# Patient Record
Sex: FEMALE | Race: WHITE | Employment: FULL TIME | URBAN - METROPOLITAN AREA
[De-identification: names, ages, dates, MRNs, and addresses within clinical notes are randomized per-mention and may not be internally consistent; named-entity substitution may affect disease eponyms.]

---

## 2017-10-12 ENCOUNTER — TELEPHONE (OUTPATIENT)
Dept: INTERNAL MEDICINE CLINIC | Age: 37
End: 2017-10-12

## 2018-06-11 ENCOUNTER — OFFICE VISIT (OUTPATIENT)
Dept: INTERNAL MEDICINE CLINIC | Age: 38
End: 2018-06-11

## 2018-06-11 VITALS
BODY MASS INDEX: 27.58 KG/M2 | SYSTOLIC BLOOD PRESSURE: 110 MMHG | WEIGHT: 182 LBS | OXYGEN SATURATION: 98 % | TEMPERATURE: 98.3 F | DIASTOLIC BLOOD PRESSURE: 72 MMHG | RESPIRATION RATE: 12 BRPM | HEART RATE: 78 BPM | HEIGHT: 68 IN

## 2018-06-11 DIAGNOSIS — R20.2 TINGLING: ICD-10-CM

## 2018-06-11 DIAGNOSIS — F41.9 ANXIETY: ICD-10-CM

## 2018-06-11 DIAGNOSIS — E66.3 OVERWEIGHT (BMI 25.0-29.9): ICD-10-CM

## 2018-06-11 DIAGNOSIS — Z00.00 ROUTINE PHYSICAL EXAMINATION: Primary | ICD-10-CM

## 2018-06-11 DIAGNOSIS — F33.42 RECURRENT MAJOR DEPRESSIVE DISORDER, IN FULL REMISSION (HCC): ICD-10-CM

## 2018-06-11 NOTE — MR AVS SNAPSHOT
[address] [employer] [employer] [address] 
[phone] Patient: Artie Torres MRN: [id number] RWS:[id number] Visit Information Date & Time Provider Department Dept. Phone Encounter #  
 6/11/2018 10:30 AM [facility]essa Olivier [id number] [phone] [id number] Follow-up Instructions Return in about 2 years (around 6/11/2020) for FULL PHYSICAL - 30 minutes. Upcoming Health Maintenance Date Due  
 PAP AKA CERVICAL CYTOLOGY 8/2/2002 Influenza Age 5 to Adult 8/1/2018 DTaP/Tdap/Td series (3 - Td) 12/16/2026 Allergies as of 6/11/2018  Review Complete On: 6/11/2018 By: Vanessa Olivier MD  
  
 Severity Noted Reaction Type Reactions Iodinated Contrast- Oral And Iv Dye  09/08/2014    Hives Current Immunizations  Reviewed on 6/11/2018 Name Date Influenza Vaccine 10/16/2017, 10/1/2014, 10/1/2013 Tdap 12/16/2016, 10/1/2013 Reviewed by Sasha Mota RN on 6/11/2018 at 10:25 AM  
You Were Diagnosed With   
  
 Codes Comments Routine physical examination    -  Primary ICD-10-CM: Z00.00 ICD-9-CM: V70.0 Overweight (BMI 25.0-29. 9)     ICD-10-CM: E42.4 ICD-9-CM: 278.02 Recurrent major [facility]     ICD-10-CM: F33.42 
ICD-9-CM: 296.36 Anxiety     ICD-10-CM: F41.9 ICD-9-CM: 300.00 Vitals BP Pulse Temp Resp Height(growth percentile) Weight(growth percentile) 110/72 78 98.3 °F (36.8 °C) (Oral) 12 5' 7.6\" (1.717 m) 182 lb (82.6 kg) LMP SpO2 Breastfeeding? BMI OB Status Smoking Status 06/05/2018 98% Unknown 28 kg/m2 Having regular periods Never Smoker BMI and BSA Data Body Mass Index Body Surface Area  
 28 kg/m 2 1.98 m 2 Preferred Pharmacy Pharmacy Name Phone [facility], [address] [place] [address] [phone] Your Updated Medication List  
  
   
 This list is accurate as of 6/11/18 11:01 AM.  Always use your most recent med list.  
  
  
  
  
 MULTIVITAMIN PO Take  by mouth daily. We Performed the Following LIPID PANEL [30328 CPT(R)] METABOLIC PANEL, COMPREHENSIVE [26906 CPT(R)] Follow-up Instructions Return in about 2 years (around 6/11/2020) for FULL PHYSICAL - 30 minutes. Patient Instructions Well Visit, Ages 25 to 48: Care Instructions Your Care Instructions Physical exams can help you stay healthy. Your doctor has checked your overall health and may have suggested ways to take good care of yourself. He or she also may have recommended tests. At home, you can help prevent illness with healthy eating, regular exercise, and other steps. Follow-up care is a key part of your treatment and safety. Be sure to make and go to all appointments, and call your doctor if you are having problems. It's also a good idea to know your test results and keep a list of the medicines you take. How can you care for yourself at home? · Reach and stay at a healthy weight. This will lower your risk for many problems, such as obesity, diabetes, heart disease, and high blood pressure. · Get at least 30 minutes of physical activity on most days of the week. Walking is a good choice. You also may want to do other activities, such as running, swimming, cycling, or playing tennis or team sports. Discuss any changes in your exercise program with your doctor. · Do not smoke or allow others to smoke around you. If you need help quitting, talk to your doctor about stop-smoking programs and medicines. These can increase your chances of quitting for good. · Talk to your doctor about whether you have any risk factors for sexually transmitted infections (STIs). Having one sex partner (who does not have STIs and does not have sex with anyone else) is a good way to avoid these infections. · Use birth control if you do not want to have children at this time. Talk with your doctor about the choices available and what might be best for you. · Protect your skin from too much sun. When you're outdoors from 10 a.m. to 4 p.m., stay in the shade or cover up with clothing and a hat with a wide brim. Wear sunglasses that block UV rays. Even when it's cloudy, put broad-spectrum sunscreen (SPF 30 or higher) on any exposed skin. · See a dentist one or two times a year for checkups and to have your teeth cleaned. · Wear a seat belt in the car. · Drink alcohol in moderation, if at all. That means no more than 2 drinks a day for men and 1 drink a day for women. Follow your doctor's advice about when to have certain tests. These tests can spot problems early. For everyone · Cholesterol. Have the fat (cholesterol) in your blood tested after age 21. Your doctor will tell you how often to have this done based on your age, family history, or other things that can increase your risk for heart disease. · Blood pressure. Have your blood pressure checked during a routine doctor visit. Your doctor will tell you how often to check your blood pressure based on your age, your blood pressure results, and other factors. · Vision. Talk with your doctor about how often to have a glaucoma test. 
· Diabetes. Ask your doctor whether you should have tests for diabetes. · Colon cancer. Have a test for colon cancer at age 48. You may have one of several tests. If you are younger than 48, you may need a test earlier if you have any risk factors. Risk factors include whether you already had a precancerous polyp removed from your colon or whether your parent, brother, sister, or child has had colon cancer. For women · Breast exam and mammogram. Talk to your doctor about when you should have a clinical breast exam and a mammogram. Medical experts differ on whether and how often women under 50 should have these tests.  Your doctor can help you decide what is right for you. · Pap test and pelvic exam. Begin Pap tests at age 24. A Pap test is the best way to find cervical cancer. The test often is part of a pelvic exam. Ask how often to have this test. 
· Tests for sexually transmitted infections (STIs). Ask whether you should have tests for STIs. You may be at risk if you have sex with more than one person, especially if your partners do not wear condoms. For men · Tests for sexually transmitted infections (STIs). Ask whether you should have tests for STIs. You may be at risk if you have sex with more than one person, especially if you do not wear a condom. · Testicular cancer exam. Ask your doctor whether you should check your testicles regularly. · Prostate exam. Talk to your doctor about whether you should have a blood test (called a PSA test) for prostate cancer. Experts differ on whether and when men should have this test. Some experts suggest it if you are older than 39 and are -American or have a father or brother who got prostate cancer when he was younger than 72. When should you call for help? Watch closely for changes in your health, and be sure to contact your doctor if you have any problems or symptoms that concern you. Where can you learn more? Go to http://babatunde-nichelle.info/. Enter P072 in the search box to learn more about \"Well Visit, Ages 25 to 48: Care Instructions. \" Current as of: May 12, 2017 Content Version: 11.4 © 5299-7544 Healthwise, Incorporated. Care instructions adapted under license by [employer] (which disclaims liability or warranty for this information). If you have questions about a medical condition or this instruction, always ask your healthcare professional. [place] [address] any warranty or liability for your use of this information. Introducing [facility] [place] [facility]!    
 [facility] introduces [employer] patient portal. Now you can access parts of your medical record, email your doctor's office, and request medication refills online. 1. In your internet browser, go to [email or web address] 2. Click on the First Time User? Click Here link in the Sign In box. You will see the New Member Sign Up page. 3. Enter your [employer] Access Code exactly as it appears below. You will not need to use this code after youve completed the sign-up process. If you do not sign up before the expiration date, you must request a new code. · [employer] Access Code: [email or web address] Expires: 9/9/2018 11:01 AM 
 
4. Enter the last four digits of your Social Security Number (xxxx) and Date of Birth (mm/dd/yyyy) as indicated and click Submit. You will be taken to the next sign-up page. 5. Create a [employer] ID. This will be your [employer] login ID and cannot be changed, so think of one that is secure and easy to remember. 6. Create a [employer] password. You can change your password at any time. 7. Enter your Password Reset Question and Answer. This can be used at a later time if you forget your password. 8. Enter your e-mail address. You will receive e-mail notification when new information is available in [address] 9. Click Sign Up. You can now view and download portions of your medical record. 10. Click the Download Summary menu link to download a portable copy of your medical information. If you have questions, please visit the Frequently Asked Questions section of the [employer] website. Remember, [employer] is NOT to be used for urgent needs. For medical emergencies, dial 911. Now available from your iPhone and Android! Please provide this summary of care documentation to your next provider. Your primary care clinician is listed as Niki Ortiz. If you have any questions after today's visit, please call [phone].

## 2018-06-11 NOTE — PATIENT INSTRUCTIONS

## 2018-06-11 NOTE — PROGRESS NOTES
Krunal Steve is a 40 y.o. female who was seen in clinic today (6/11/2018) for a full physical.  She was last seen in '16. Assessment & Plan:   Diagnoses and all orders for this visit:    1. Routine physical examination  -     METABOLIC PANEL, COMPREHENSIVE  -     LIPID PANEL  -     Labs from OB/GYN reviewed & discussed with her     2. Overweight (BMI 25.0-29. 9)- stable, needs improvement, I have reviewed/discussed the above normal BMI with the patient. I have recommended the following interventions: encourage exercise and lifestyle education regarding diet. 3. Recurrent major depressive disorder, in full remission ([facility]- well controlled and asymptomatic     4. Anxiety- this is a new issue to me, worse since children were born, fluctuating intensity, reviewed treatment options with her, recommended to see a counselor, reviewed life style changes to help improve mood, and at this point time will hold off starting on daily meds. She will reevaluate in the next several months and notify me if things get worse. Reviewed red flags to monitor for. 5. Tingling - new dx to me, chronic issue, on and off, sounds positional, reviewed red flags to monitor for, reviewed lifestyle changes         Follow-up Disposition:  Return in about 2 years (around 6/11/2020) for FULL PHYSICAL - 30 minutes. ------------------------------------------------------------------------------------------    Subjective: Tasha Teresa is here today for a full physical.      Health Maintenance  Immunizations:    Influenza: up to date. Tetanus: up to date. Cancer screening:    Cervical: reviewed guidelines, UTD, done by OBGYN, records requested. Breast: n/a, reviewed guidelines      Patient Care Team:  Navdeep Fair MD as PCP - General (Internal Medicine)  Nicolasa Bae MD as Physician (Obstetrics & Gynecology)         The following sections were reviewed & updated as appropriate: PMH, PSH, FH, and SH.     Patient Active Problem List   Diagnosis Code    Depression F32.9          Prior to Admission medications    Medication Sig Start Date End Date Taking? Authorizing Provider   MULTIVITAMIN PO Take  by mouth daily. Yes Historical Provider          Allergies   Allergen Reactions    Iodinated Contrast- Oral And Iv Dye Hives              Review of Systems   Constitutional: Negative for chills and fever. Respiratory: Negative for cough and shortness of breath. Cardiovascular: Negative for chest pain and palpitations. Gastrointestinal: Negative for abdominal pain, blood in stool, constipation, diarrhea, heartburn, nausea and vomiting. Musculoskeletal: Negative for joint pain and myalgias. Neurological: Positive for tingling (On/off, mostly in hands but can occur and feet, always position related: Laying down or sitting on the sofa). Negative for focal weakness and headaches. Endo/Heme/Allergies: Does not bruise/bleed easily. Psychiatric/Behavioral: Negative for depression. The patient is not nervous/anxious and does not have insomnia. Objective:   Physical Exam   Constitutional: She appears well-developed. No distress. HENT:   Right Ear: Tympanic membrane, external ear and ear canal normal.   Left Ear: Tympanic membrane, external ear and ear canal normal.   Nose: Nose normal.   Mouth/Throat: Uvula is midline, oropharynx is clear and moist and mucous membranes are normal. No posterior oropharyngeal erythema. Eyes: Conjunctivae and lids are normal. No scleral icterus. Neck: Neck supple. Carotid bruit is not present. No thyromegaly present. Cardiovascular: Regular rhythm and normal heart sounds. No murmur heard. Pulses:       Dorsalis pedis pulses are 2+ on the right side, and 2+ on the left side. Posterior tibial pulses are 2+ on the right side, and 2+ on the left side. Pulmonary/Chest: Effort normal and breath sounds normal. She has no wheezes. She has no rales. Abdominal: Soft. Bowel sounds are normal. She exhibits no mass. There is no hepatosplenomegaly. There is no tenderness. Musculoskeletal: Normal range of motion. She exhibits no edema. Cervical back: Normal.        Thoracic back: She exhibits no bony tenderness. Lumbar back: Normal.   Lymphadenopathy:     She has no cervical adenopathy. Neurological: She has normal strength. No sensory deficit. Skin: No rash noted. Psychiatric: She has a normal mood and affect. Her behavior is normal.          Visit Vitals    /72    Pulse 78    Temp 98.3 °F (36.8 °C) (Oral)    Resp 12    Ht 5' 7.6\" (1.717 m)    Wt 182 lb (82.6 kg)    LMP 06/05/2018    SpO2 98%    Breastfeeding Unknown    BMI 28 kg/m2          Advised her to call back or return to office if symptoms worsen/change/persist.  Discussed expected course/resolution/complications of diagnosis in detail with patient. Medication risks/benefits/costs/interactions/alternatives discussed with patient. She was given an after visit summary which includes diagnoses, current medications, & vitals. She expressed understanding with the diagnosis and plan. Aspects of this note may have been generated using voice recognition software. Despite editing, there may be some syntax errors.        Didier Church MD

## 2018-06-12 LAB
ALBUMIN SERPL-MCNC: 4.6 G/DL (ref 3.5–5.5)
ALBUMIN/GLOB SERPL: 1.8 {RATIO} (ref 1.2–2.2)
ALP SERPL-CCNC: 39 IU/L (ref 39–117)
ALT SERPL-CCNC: 9 IU/L (ref 0–32)
AST SERPL-CCNC: 17 IU/L (ref 0–40)
BILIRUB SERPL-MCNC: 0.4 MG/DL (ref 0–1.2)
BUN SERPL-MCNC: 12 MG/DL (ref 6–20)
BUN/CREAT SERPL: 18 (ref 9–23)
CALCIUM SERPL-MCNC: 9.1 MG/DL (ref 8.7–10.2)
CHLORIDE SERPL-SCNC: 101 MMOL/L (ref 96–106)
CHOLEST SERPL-MCNC: 185 MG/DL (ref 100–199)
CO2 SERPL-SCNC: 26 MMOL/L (ref 20–29)
CREAT SERPL-MCNC: 0.67 MG/DL (ref 0.57–1)
GFR SERPLBLD CREATININE-BSD FMLA CKD-EPI: 113 ML/MIN/1.73
GFR SERPLBLD CREATININE-BSD FMLA CKD-EPI: 131 ML/MIN/1.73
GLOBULIN SER CALC-MCNC: 2.6 G/DL (ref 1.5–4.5)
GLUCOSE SERPL-MCNC: 88 MG/DL (ref 65–99)
HDLC SERPL-MCNC: 56 MG/DL
LDLC SERPL CALC-MCNC: 118 MG/DL (ref 0–99)
POTASSIUM SERPL-SCNC: 3.9 MMOL/L (ref 3.5–5.2)
PROT SERPL-MCNC: 7.2 G/DL (ref 6–8.5)
SODIUM SERPL-SCNC: 140 MMOL/L (ref 134–144)
TRIGL SERPL-MCNC: 57 MG/DL (ref 0–149)
VLDLC SERPL CALC-MCNC: 11 MG/DL (ref 5–40)

## 2019-01-08 ENCOUNTER — OFFICE VISIT (OUTPATIENT)
Dept: INTERNAL MEDICINE CLINIC | Age: 39
End: 2019-01-08

## 2019-01-08 VITALS
WEIGHT: 188 LBS | DIASTOLIC BLOOD PRESSURE: 76 MMHG | OXYGEN SATURATION: 98 % | RESPIRATION RATE: 18 BRPM | BODY MASS INDEX: 28.92 KG/M2 | TEMPERATURE: 98.2 F | SYSTOLIC BLOOD PRESSURE: 128 MMHG | HEART RATE: 86 BPM

## 2019-01-08 DIAGNOSIS — F33.42 RECURRENT MAJOR DEPRESSIVE DISORDER, IN FULL REMISSION (HCC): ICD-10-CM

## 2019-01-08 DIAGNOSIS — R11.0 NAUSEA: ICD-10-CM

## 2019-01-08 DIAGNOSIS — N92.6 MENSTRUAL CHANGES: ICD-10-CM

## 2019-01-08 DIAGNOSIS — K64.8 INTERNAL HEMORRHOID: Primary | ICD-10-CM

## 2019-01-08 RX ORDER — LANOLIN ALCOHOL/MO/W.PET/CERES
325 CREAM (GRAM) TOPICAL
COMMUNITY
End: 2019-04-30

## 2019-01-08 RX ORDER — OMEPRAZOLE 40 MG/1
40 CAPSULE, DELAYED RELEASE ORAL DAILY
Qty: 30 CAP | Refills: 1 | Status: SHIPPED | OUTPATIENT
Start: 2019-01-08 | End: 2019-04-30

## 2019-01-08 RX ORDER — ZINC GLUCONATE 10 MG
LOZENGE ORAL
COMMUNITY
End: 2019-04-30

## 2019-01-08 NOTE — PROGRESS NOTES
Bozena Ayers is a 41 y.o. female who was seen in clinic today (1/8/2019) for an acute visit. Assessment & Plan:  
Diagnoses and all orders for this visit: 
 
1. Internal hemorrhoid- this is a chronic problem but new to me, symptoms are: intermittent, differential dx reviewed with the patient, exam is normal but symptoms sound consistent  Reviewed treatment options, will have her talk to surgeon. See AVS. -     REFERRAL TO COLON AND RECTAL SURGERY 2. Nausea- this is a new problem, symptoms are: intermittent, differential dx reviewed with the patient, exact etiology is unclear at this time. Sounds like it is GI related. Will start on meds below and reviewed expectations. She will update me in 2-4 weeks. Red flags were reviewed with the patient to RTC or notify me, expected time course for resolution reviewed. -     omeprazole (PRILOSEC) 40 mg capsule; Take 1 Cap by mouth daily. 3. Menstrual changes- new dx, is symptomatic, reviewed she has had 3 OBGYN's that recommended trial of OCP and I would have to agree I think it is worth a trial.  Reviewed  Trying meds for 3--6 months and then reassessing. 4. Recurrent major depressive disorder, in full remission ([facility]- well controlled off medications, continue w/ life style changes Follow-up Disposition: Not on File Subjective: Nannette was seen today for Hemorrhoids Hemorrhoids Patient complains of hemorrhoids. Onset of symptoms was 6 months ago ago with gradually worsening course since that time. She describes symptoms as bleeding on/off with BMs, pain with sitting, painful w/ intercourse. She denies painful defecation or constipation or straining w/ BM's. She has felt a mass and has had to push it back at times. Treatment to date has been OTC creams and increase in fiber. She reports these have not really helped. She had flex sig last spring w/ Dr Zara Lala, records requested.   She can't remember why she had the procedure. She does reports being told she had a hemorrhoid. She also reports changes in her menses. Increase in spotting, increase in nausea, and irregular cycles. She has seen 3 different OBGYN's, the recommendation is to start OCP which she is hesitant. Prior to Admission medications Medication Sig Start Date End Date Taking? Authorizing Provider  
ferrous sulfate 325 mg (65 mg iron) tablet Take 325 mg by mouth. Yes Provider, Historical  
magnesium 250 mg tab Take  by mouth. Yes Provider, Historical  
calcium-cholecalciferol, d3, (CALCIUM 600 + D) 600-125 mg-unit tab Take  by mouth. Yes Provider, Historical  
MULTIVITAMIN PO Take  by mouth daily. Yes Provider, Historical  
  
 
 
Allergies Allergen Reactions  Iodinated Contrast- Oral And Iv Dye Hives Review of Systems Constitutional: Negative for malaise/fatigue and weight loss. Respiratory: Negative for cough and shortness of breath. Cardiovascular: Negative for chest pain and palpitations. Gastrointestinal: Positive for blood in stool and nausea. Negative for abdominal pain, constipation, diarrhea, melena and vomiting. Psychiatric/Behavioral: Negative for depression. The patient is not nervous/anxious and does not have insomnia. Objective:  
Physical Exam  
Cardiovascular: Regular rhythm and normal heart sounds. No murmur heard. Pulmonary/Chest: Effort normal and breath sounds normal. She has no wheezes. She has no rales. Abdominal: Bowel sounds are normal. She exhibits no mass. There is no hepatosplenomegaly. There is no tenderness. Genitourinary: Rectal exam shows no external hemorrhoid, no internal hemorrhoid, no fissure, no mass and no tenderness. Genitourinary Comments: Chaperone: Minna Brice LPN Visit Vitals /76 (BP 1 Location: Right arm, BP Patient Position: Sitting) Pulse 86 Temp 98.2 °F (36.8 °C) (Oral) Resp 18 Wt 188 lb (85.3 kg) SpO2 98% BMI 28.92 kg/m² Disclaimer: 
Advised her to call back or return to office if symptoms worsen/change/persist. 
Discussed expected course/resolution/complications of diagnosis in detail with patient. Medication risks/benefits/costs/interactions/alternatives discussed with patient. She was given an after visit summary which includes diagnoses, current medications, & vitals. She expressed understanding with the diagnosis and plan. Aspects of this note may have been generated using voice recognition software. Despite editing, there may be some syntax errors.   
 
 
Melanie Berkowitz MD

## 2019-01-08 NOTE — PROGRESS NOTES
Chief Complaint Patient presents with  Hemorrhoids Patient states that over the last 2 years it has become uncomfortable. Patient states that it is painful to sit and have vaginal sex. Patient states she has been treating with OTC creams. Patient has 2-3 normal BM a day and does not strain. Patient noted that a month ago she went to the bathroom and she noticed a lot of blood. She also noticed it gets worse around her cycle.

## 2019-01-08 NOTE — PATIENT INSTRUCTIONS
Hemorrhoids: Care Instructions Your Care Instructions Hemorrhoids are enlarged veins that develop in the anal canal. Bleeding during bowel movements, itching, swelling, and rectal pain are the most common symptoms. They can be uncomfortable at times, but hemorrhoids rarely are a serious problem. You can treat most hemorrhoids with simple changes to your diet and bowel habits. These changes include eating more fiber and not straining to pass stools. Most hemorrhoids do not need surgery or other treatment unless they are very large and painful or bleed a lot. Follow-up care is a key part of your treatment and safety. Be sure to make and go to all appointments, and call your doctor if you are having problems. It's also a good idea to know your test results and keep a list of the medicines you take. How can you care for yourself at home? · Sit in a few inches of warm water (sitz bath) 3 times a day and after bowel movements. The warm water helps with pain and itching. · Put ice on your anal area several times a day for 10 minutes at a time. Put a thin cloth between the ice and your skin. Follow this by placing a warm, wet towel on the area for another 10 to 20 minutes. · Take pain medicines exactly as directed. ? If the doctor gave you a prescription medicine for pain, take it as prescribed. ? If you are not taking a prescription pain medicine, ask your doctor if you can take an over-the-counter medicine. · Keep the anal area clean, but be gentle. Use water and a fragrance-free soap, such as [place], or use baby wipes or medicated pads, such as Tucks. · Wear cotton underwear and loose clothing to decrease moisture in the anal area. · Eat more fiber. Include foods such as whole-grain breads and cereals, raw vegetables, raw and dried fruits, and beans. · Drink plenty of fluids, enough so that your urine is light yellow or clear like water.  If you have kidney, heart, or liver disease and have to limit fluids, talk with your doctor before you increase the amount of fluids you drink. · Use a stool softener that contains bran or psyllium. You can save money by buying bran or psyllium (available in bulk at most health food stores) and sprinkling it on foods or stirring it into fruit juice. Or you can use a product such as Metamucil or Hydrocil. · Practice healthy bowel habits. ? Go to the bathroom as soon as you have the urge. ? Avoid straining to pass stools. Relax and give yourself time to let things happen naturally. ? Do not hold your breath while passing stools. ? Do not read while sitting on the toilet. Get off the toilet as soon as you have finished. · Take your medicines exactly as prescribed. Call your doctor if you think you are having a problem with your medicine. When should you call for help? Call 911 anytime you think you may need emergency care. For example, call if: 
  · You pass maroon or very bloody stools.  
 Call your doctor now or seek immediate medical care if: 
  · You have increased pain.  
  · You have increased bleeding.  
 Watch closely for changes in your health, and be sure to contact your doctor if: 
  · Your symptoms have not improved after 3 or 4 days. Where can you learn more? Go to http://babatunde-nichelle.info/. Enter F228 in the search box to learn more about \"Hemorrhoids: Care Instructions. \" Current as of: March 28, 2018 Content Version: 11.8 © 0168-5899 [employer]. Care instructions adapted under license by [employer] (which disclaims liability or warranty for this information). If you have questions about a medical condition or this instruction, always ask your healthcare professional. [place] [address] any warranty or liability for your use of this information. Gastroesophageal Reflux Disease (GERD): Care Instructions Your Care Instructions Gastroesophageal reflux disease (GERD) is the backward flow of stomach acid into the esophagus. The esophagus is the tube that leads from your throat to your stomach. A one-way valve prevents the stomach acid from moving up into this tube. When you have GERD, this valve does not close tightly enough. If you have mild GERD symptoms including heartburn, you may be able to control the problem with antacids or over-the-counter medicine. Changing your diet, losing weight, and making other lifestyle changes can also help reduce symptoms. Follow-up care is a key part of your treatment and safety. Be sure to make and go to all appointments, and call your doctor if you are having problems. It's also a good idea to know your test results and keep a list of the medicines you take. How can you care for yourself at home? · Take your medicines exactly as prescribed. Call your doctor if you think you are having a problem with your medicine. · Your doctor may recommend over-the-counter medicine. For mild or occasional indigestion, antacids, such as Tums, Gaviscon, Mylanta, or Maalox, may help. Your doctor also may recommend over-the-counter acid reducers, such as Pepcid AC, Tagamet HB, Zantac 75, or Prilosec. Read and follow all instructions on the label. If you use these medicines often, talk with your doctor. · Change your eating habits. ? It's best to eat several small meals instead of two or three large meals. ? After you eat, wait 2 to 3 hours before you lie down. ? Chocolate, mint, and alcohol can make GERD worse. ? Spicy foods, foods that have a lot of acid (like tomatoes and oranges), and coffee can make GERD symptoms worse in some people. If your symptoms are worse after you eat a certain food, you may want to stop eating that food to see if your symptoms get better. · Do not smoke or chew tobacco. Smoking can make GERD worse.  If you need help quitting, talk to your doctor about stop-smoking programs and medicines. These can increase your chances of quitting for good. · If you have GERD symptoms at night, raise the head of your bed 6 to 8 inches by putting the frame on blocks or placing a foam wedge under the head of your mattress. (Adding extra pillows does not work.) · Do not wear tight clothing around your middle. · Lose weight if you need to. Losing just 5 to 10 pounds can help. When should you call for help? Call your doctor now or seek immediate medical care if: 
  · You have new or different belly pain.  
  · Your stools are black and tarlike or have streaks of blood.  
 Watch closely for changes in your health, and be sure to contact your doctor if: 
  · Your symptoms have not improved after 2 days.  
  · Food seems to catch in your throat or chest.  
Where can you learn more? Go to http://babatunde-nichelle.info/. Enter P985 in the search box to learn more about \"Gastroesophageal Reflux Disease (GERD): Care Instructions. \" Current as of: March 28, 2018 Content Version: 11.8 © 2654-2013 [employer]. Care instructions adapted under license by [employer] (which disclaims liability or warranty for this information). If you have questions about a medical condition or this instruction, always ask your healthcare professional. [address] any warranty or liability for your use of this information.

## 2019-01-21 ENCOUNTER — TELEPHONE (OUTPATIENT)
Dept: INTERNAL MEDICINE CLINIC | Age: 39
End: 2019-01-21

## 2019-01-21 NOTE — TELEPHONE ENCOUNTER
Please call to discuss the Prilosec.  It is not helping at  all  And she said now she is going to the bathroom alot

## 2019-01-22 NOTE — TELEPHONE ENCOUNTER
Patient is going to stop the prilosec per Dr. Ihsan Nick. No new symptoms since last visit just worsening. She reports she feels tired, nauseated, having \"hot flashes\", her menstrual cycles are heavier but will spot for several days. States she spoke to her Midwife (child is now 3years old) who told her she might have an \"estrogen dominance\". Midwife told her to take an Omega 369 supplement, vitex supplement and B12 complex that could help \"balance out her hormones\". Wants Dr. Ihsan Nick' opinion on this.

## 2019-01-22 NOTE — TELEPHONE ENCOUNTER
Stop the prilosec. She had been seen for nausea, is she having any other symptoms. What has changed since last visit?

## 2019-01-23 NOTE — TELEPHONE ENCOUNTER
Verified patient identity with two identifiers. Spoke with patient by phone. She is seeing Dr. Rodrick Bah DO with [facility] tomorrow.

## 2019-04-30 ENCOUNTER — OFFICE VISIT (OUTPATIENT)
Dept: INTERNAL MEDICINE CLINIC | Age: 39
End: 2019-04-30

## 2019-04-30 VITALS
BODY MASS INDEX: 28.34 KG/M2 | HEART RATE: 88 BPM | OXYGEN SATURATION: 97 % | HEIGHT: 68 IN | TEMPERATURE: 98.3 F | DIASTOLIC BLOOD PRESSURE: 68 MMHG | SYSTOLIC BLOOD PRESSURE: 104 MMHG | RESPIRATION RATE: 16 BRPM | WEIGHT: 187 LBS

## 2019-04-30 DIAGNOSIS — R05.9 COUGH: Primary | ICD-10-CM

## 2019-04-30 RX ORDER — FLUTICASONE PROPIONATE 50 MCG
2 SPRAY, SUSPENSION (ML) NASAL DAILY
Qty: 1 BOTTLE | Refills: 0 | COMMUNITY
Start: 2019-04-30 | End: 2021-11-19

## 2019-04-30 NOTE — PROGRESS NOTES
Agata Santos is a 41 y.o. female who was seen in clinic today (4/30/2019) for an acute visit. Assessment & Plan:     Diagnoses and all orders for this visit:    1. Cough- this is a new problem, symptoms are: not changed, differential dx reviewed with the patient, favor more post nasal drip then post infectious. Will treat with: oral anti-histamines & nasal steroids & saline rinses. Red flags were reviewed with the patient to RTC or notify me, expected time course for resolution reviewed. See AVS.  She will update me next week if no changes. Follow-up and Dispositions    · Return if symptoms worsen or fail to improve. Subjective: Nannette was seen today for Cough    URI Review  Nannette returns to clinic today to talk about: cough for 4-6 weeks, which are unchanged since that time. Cough is dry, mostly in the morning. She reports sore throat, post nasal drip, rhinorrhea, sinus congestion, chest congestion, wheezing and SOB/LOPEZ. She denies recently having any issues with fever, chills, sinus congestion and chest congestion. Treatments have included: none. Relevant PMH: No pertinent additional PMH. Patient reports sick contacts: no.  She recently moved, she is renovating a new house. Prior to Admission medications    Medication Sig Start Date End Date Taking? Authorizing Provider   calcium-cholecalciferol, d3, (CALCIUM 600 + D) 600-125 mg-unit tab Take  by mouth. Yes Provider, Historical          Allergies   Allergen Reactions    Iodinated Contrast- Oral And Iv Dye Hives           ROS - per HPI        Objective:   Physical Exam   Constitutional: No distress. HENT:   Right Ear: Tympanic membrane is not erythematous and not bulging. No middle ear effusion. Left Ear: Tympanic membrane is not erythematous and not bulging. No middle ear effusion. Nose: No mucosal edema or rhinorrhea. Right sinus exhibits no maxillary sinus tenderness and no frontal sinus tenderness.  Left sinus exhibits no maxillary sinus tenderness and no frontal sinus tenderness. Mouth/Throat: Uvula is midline and mucous membranes are normal. No oropharyngeal exudate or posterior oropharyngeal erythema. Eyes: Conjunctivae are normal. No scleral icterus. Neck: Neck supple. Cardiovascular: Regular rhythm and normal heart sounds. No murmur heard. Pulmonary/Chest: Effort normal and breath sounds normal. She has no wheezes. She has no rales. Lymphadenopathy:     She has no cervical adenopathy. Visit Vitals  /68   Pulse 88   Temp 98.3 °F (36.8 °C) (Oral)   Resp 16   Ht 5' 7.6\" (1.717 m)   Wt 187 lb (84.8 kg)   LMP 04/09/2019 (Approximate)   SpO2 97%   BMI 28.77 kg/m²         Disclaimer:  Advised her to call back or return to office if symptoms worsen/change/persist.  Discussed expected course/resolution/complications of diagnosis in detail with patient. Medication risks/benefits/costs/interactions/alternatives discussed with patient. She was given an after visit summary which includes diagnoses, current medications, & vitals. She expressed understanding with the diagnosis and plan. Aspects of this note may have been generated using voice recognition software. Despite editing, there may be some syntax errors.        Alicia Santamaria MD

## 2019-05-29 ENCOUNTER — OFFICE VISIT (OUTPATIENT)
Dept: INTERNAL MEDICINE CLINIC | Age: 39
End: 2019-05-29

## 2019-05-29 VITALS
TEMPERATURE: 98 F | DIASTOLIC BLOOD PRESSURE: 90 MMHG | HEIGHT: 68 IN | BODY MASS INDEX: 27.77 KG/M2 | OXYGEN SATURATION: 98 % | SYSTOLIC BLOOD PRESSURE: 130 MMHG | RESPIRATION RATE: 18 BRPM | WEIGHT: 183.2 LBS | HEART RATE: 78 BPM

## 2019-05-29 DIAGNOSIS — R11.0 NAUSEA: ICD-10-CM

## 2019-05-29 DIAGNOSIS — J30.1 SEASONAL ALLERGIC RHINITIS DUE TO POLLEN: Primary | ICD-10-CM

## 2019-05-29 RX ORDER — ONDANSETRON 8 MG/1
8 TABLET, ORALLY DISINTEGRATING ORAL
Qty: 30 TAB | Refills: 0 | Status: SHIPPED | OUTPATIENT
Start: 2019-05-29 | End: 2019-09-09

## 2019-05-29 NOTE — PROGRESS NOTES
Acute Care Note    Abraham King is 41 y.o. female. she presents for evaluation of Nausea    The patient complains of intermittent nausea. She notes that this is been for the previous 3 months. At this point, she is unsure of the cause. She has had a flare of this over the previous 2 days. She also feels slightly dizzy and feels as if she has some active postnasal drip. She was taking Flonase at a previous time. Now, she is only taking Benadryl. She is not taking any other allergy medications. Prior to Admission medications    Medication Sig Start Date End Date Taking? Authorizing Provider   fluticasone propionate (FLONASE) 50 mcg/actuation nasal spray 2 Sprays by Both Nostrils route daily. 4/30/19   Cyndy Barron MD   calcium-cholecalciferol, d3, (CALCIUM 600 + D) 600-125 mg-unit tab Take  by mouth. Provider, Historical         Patient Active Problem List   Diagnosis Code    Recurrent major depressive disorder ([facility] F33.9    Anxiety F41.9         Review of Systems   Constitutional: Negative. HENT: Positive for congestion. Respiratory: Positive for cough. Gastrointestinal: Positive for nausea. Negative for vomiting. Visit Vitals  /90 (BP 1 Location: Right arm, BP Patient Position: Sitting)   Pulse 78   Temp 98 °F (36.7 °C) (Oral)   Resp 18   Ht 5' 7.6\" (1.717 m)   Wt 183 lb 3.2 oz (83.1 kg)   LMP 05/15/2019   SpO2 98%   BMI 28.19 kg/m²       Physical Exam   Constitutional: She appears well-developed and well-nourished. HENT:   Mouth/Throat: Oropharynx is clear and moist.   Cardiovascular: Normal rate and regular rhythm. Pulmonary/Chest: Effort normal and breath sounds normal.           ASSESSMENT/PLAN  Diagnoses and all orders for this visit:    1. Seasonal allergic rhinitis due to pollen -darron with the patient that her symptoms seem likely as result of seasonal allergy.   Since she is not using any other antihistamine, she was advised to use Zyrtec at night.  She will follow-up if not improving. Advised the patient to call back or return to office if symptoms worsen/change/persist.   Discussed expected course/resolution/complications of diagnosis in detail with patient. Medication risks/benefits/costs/interactions/alternatives discussed with patient. The patient was given an after visit summary which includes diagnoses, current medications, & vitals. They expressed understanding with the diagnosis and plan.

## 2019-09-09 ENCOUNTER — OFFICE VISIT (OUTPATIENT)
Dept: INTERNAL MEDICINE CLINIC | Age: 39
End: 2019-09-09

## 2019-09-09 VITALS
WEIGHT: 184 LBS | BODY MASS INDEX: 27.89 KG/M2 | OXYGEN SATURATION: 97 % | HEART RATE: 83 BPM | RESPIRATION RATE: 16 BRPM | DIASTOLIC BLOOD PRESSURE: 76 MMHG | SYSTOLIC BLOOD PRESSURE: 112 MMHG | HEIGHT: 68 IN | TEMPERATURE: 98.3 F

## 2019-09-09 DIAGNOSIS — F33.42 RECURRENT MAJOR DEPRESSIVE DISORDER, IN FULL REMISSION (HCC): ICD-10-CM

## 2019-09-09 DIAGNOSIS — Z00.00 ROUTINE PHYSICAL EXAMINATION: Primary | ICD-10-CM

## 2019-09-09 DIAGNOSIS — F41.9 ANXIETY: ICD-10-CM

## 2019-09-09 DIAGNOSIS — E66.3 OVERWEIGHT (BMI 25.0-29.9): ICD-10-CM

## 2019-09-09 RX ORDER — BISMUTH SUBSALICYLATE 262 MG
1 TABLET,CHEWABLE ORAL DAILY
COMMUNITY

## 2019-09-09 NOTE — PROGRESS NOTES
Shirly Canavan is a 46 y.o. female who was seen in clinic today (9/9/2019) for a full physical.       Assessment & Plan:   Diagnoses and all orders for this visit:    1. Routine physical examination  -     METABOLIC PANEL, COMPREHENSIVE  -     CBC W/O DIFF  -     LIPID PANEL  -     HEMOGLOBIN A1C WITH EAG  -     TSH 3RD GENERATION    2. Overweight (BMI 25.0-29. 9)- stable, needs improvement. Reviewed BMI vs IBW. She has restricted calories to 8640-6948/day and exercising 3/wk. Reviewed possible diagnosis of PCOS. Offered MTF but will defer. Offered to get her into see endocrinologist instead of OBGYN. Will check labs above. I have recommended the following interventions: encourage exercise and lifestyle education regarding diet. 3. Recurrent major depressive disorder, in full remission ([facility]- well controlled off medications    4. Anxiety- well controlled off medications. Follow-up and Dispositions    · Return in about 1 year (around 9/9/2020) for FULL PHYSICAL - 30 minutes. ------------------------------------------------------------------------------------------    Subjective: Raffaele Niño is here today for a full physical.      Health Maintenance  Immunizations:   Influenza: she will plan on getting it this fall   Tetanus: up to date    Cancer screening:   Cervical: reviewed guidelines, up to date  Breast: reviewed guidelines, n/a. Patient Care Team:  Alicia Dooley MD as PCP - General (Internal Medicine)  Camila De Jesus MD as Physician (Obstetrics & Gynecology)         The following sections were reviewed & updated as appropriate: PMH, PSH, FH, and SH. Patient Active Problem List   Diagnosis Code    Recurrent major depressive disorder (HCC) F33.9    Anxiety F41.9          Prior to Admission medications    Medication Sig Start Date End Date Taking? Authorizing Provider   multivitamin (ONE A DAY) tablet Take 1 Tab by mouth daily.    Yes Provider, Historical fluticasone propionate (FLONASE) 50 mcg/actuation nasal spray 2 Sprays by Both Nostrils route daily. 4/30/19   Tobias Perez MD          Allergies   Allergen Reactions    Iodinated Contrast Media Hives              Review of Systems   Constitutional: Negative for chills and fever. Having trouble losing weight. She was told in the past had atypical PCOS. Has seen several OBGYN's in the past.  She reports + cysts on ovaries. She does not want want to take OCP which is the recommendation from all of them   Respiratory: Negative for cough and shortness of breath. Cardiovascular: Negative for chest pain and palpitations. Gastrointestinal: Positive for blood in stool (did see colo-rectal surgeon, considering surgery, can't band it). Negative for abdominal pain, constipation, diarrhea, heartburn, nausea and vomiting. Musculoskeletal: Negative for joint pain and myalgias. Neurological: Negative for tingling, focal weakness and headaches. Endo/Heme/Allergies: Does not bruise/bleed easily. Psychiatric/Behavioral: Negative for depression. The patient is not nervous/anxious and does not have insomnia. Objective:   Physical Exam   Constitutional: She appears well-developed. No distress. HENT:   Right Ear: Tympanic membrane, external ear and ear canal normal.   Left Ear: Tympanic membrane, external ear and ear canal normal.   Nose: Nose normal.   Mouth/Throat: Uvula is midline, oropharynx is clear and moist and mucous membranes are normal. No posterior oropharyngeal erythema. Eyes: Conjunctivae and lids are normal. No scleral icterus. Neck: Neck supple. No thyromegaly present. Cardiovascular: Regular rhythm and normal heart sounds. No murmur heard. Pulses:       Dorsalis pedis pulses are 2+ on the right side, and 2+ on the left side. Posterior tibial pulses are 2+ on the right side, and 2+ on the left side.    Pulmonary/Chest: Effort normal and breath sounds normal. She has no wheezes. She has no rales. Abdominal: Soft. Bowel sounds are normal. She exhibits no mass. There is no hepatosplenomegaly. There is no tenderness. Musculoskeletal: Normal range of motion. She exhibits no edema. Cervical back: Normal.        Thoracic back: She exhibits no bony tenderness. Lumbar back: Normal.   Lymphadenopathy:     She has no cervical adenopathy. Neurological: She has normal strength. No sensory deficit. Skin: No rash noted. Psychiatric: She has a normal mood and affect. Her behavior is normal.          Visit Vitals  /76   Pulse 83   Temp 98.3 °F (36.8 °C) (Oral)   Resp 16   Ht 5' 8.25\" (1.734 m)   Wt 184 lb (83.5 kg)   SpO2 97%   BMI 27.77 kg/m²          Advised her to call back or return to office if symptoms worsen/change/persist.  Discussed expected course/resolution/complications of diagnosis in detail with patient. Medication risks/benefits/costs/interactions/alternatives discussed with patient. She was given an after visit summary which includes diagnoses, current medications, & vitals. She expressed understanding with the diagnosis and plan. Aspects of this note may have been generated using voice recognition software. Despite editing, there may be some syntax errors.        Hayes Burleson MD

## 2019-09-09 NOTE — PATIENT INSTRUCTIONS
Well Visit, Ages 25 to 48: Care Instructions  Your Care Instructions    Physical exams can help you stay healthy. Your doctor has checked your overall health and may have suggested ways to take good care of yourself. He or she also may have recommended tests. At home, you can help prevent illness with healthy eating, regular exercise, and other steps. Follow-up care is a key part of your treatment and safety. Be sure to make and go to all appointments, and call your doctor if you are having problems. It's also a good idea to know your test results and keep a list of the medicines you take. How can you care for yourself at home? · Reach and stay at a healthy weight. This will lower your risk for many problems, such as obesity, diabetes, heart disease, and high blood pressure. · Get at least 30 minutes of physical activity on most days of the week. Walking is a good choice. You also may want to do other activities, such as running, swimming, cycling, or playing tennis or team sports. Discuss any changes in your exercise program with your doctor. · Do not smoke or allow others to smoke around you. If you need help quitting, talk to your doctor about stop-smoking programs and medicines. These can increase your chances of quitting for good. · Talk to your doctor about whether you have any risk factors for sexually transmitted infections (STIs). Having one sex partner (who does not have STIs and does not have sex with anyone else) is a good way to avoid these infections. · Use birth control if you do not want to have children at this time. Talk with your doctor about the choices available and what might be best for you. · Protect your skin from too much sun. When you're outdoors from 10 a.m. to 4 p.m., stay in the shade or cover up with clothing and a hat with a wide brim. Wear sunglasses that block UV rays. Even when it's cloudy, put broad-spectrum sunscreen (SPF 30 or higher) on any exposed skin.   · See a dentist one or two times a year for checkups and to have your teeth cleaned. · Wear a seat belt in the car. Follow your doctor's advice about when to have certain tests. These tests can spot problems early. For everyone  · Cholesterol. Have the fat (cholesterol) in your blood tested after age 21. Your doctor will tell you how often to have this done based on your age, family history, or other things that can increase your risk for heart disease. · Blood pressure. Have your blood pressure checked during a routine doctor visit. Your doctor will tell you how often to check your blood pressure based on your age, your blood pressure results, and other factors. · Vision. Talk with your doctor about how often to have a glaucoma test.  · Diabetes. Ask your doctor whether you should have tests for diabetes. · Colon cancer. Your risk for colorectal cancer gets higher as you get older. Some experts say that adults should start regular screening at age 48 and stop at age 76. Others say to start before age 48 or continue after age 76. Talk with your doctor about your risk and when to start and stop screening. For women  · Breast exam and mammogram. Talk to your doctor about when you should have a clinical breast exam and a mammogram. Medical experts differ on whether and how often women under 50 should have these tests. Your doctor can help you decide what is right for you. · Cervical cancer screening test and pelvic exam. Begin with a Pap test at age 24. The test often is part of a pelvic exam. Starting at age 27, you may choose to have a Pap test, an HPV test, or both tests at the same time (called co-testing). Talk with your doctor about how often to have testing. · Tests for sexually transmitted infections (STIs). Ask whether you should have tests for STIs. You may be at risk if you have sex with more than one person, especially if your partners do not wear condoms.   For men  · Tests for sexually transmitted infections (STIs). Ask whether you should have tests for STIs. You may be at risk if you have sex with more than one person, especially if you do not wear a condom. · Testicular cancer exam. Ask your doctor whether you should check your testicles regularly. · Prostate exam. Talk to your doctor about whether you should have a blood test (called a PSA test) for prostate cancer. Experts differ on whether and when men should have this test. Some experts suggest it if you are older than 39 and are -American or have a father or brother who got prostate cancer when he was younger than 72. When should you call for help? Watch closely for changes in your health, and be sure to contact your doctor if you have any problems or symptoms that concern you. Where can you learn more? Go to http://babatunde-nichelle.info/. Enter P072 in the search box to learn more about \"Well Visit, Ages 25 to 48: Care Instructions. \"  Current as of: December 13, 2018  Content Version: 12.1  © 6918-4205 [employer] Incorporated. Care instructions adapted under license by [employer] (which disclaims liability or warranty for this information). If you have questions about a medical condition or this instruction, always ask your healthcare professional. [place] [address] any warranty or liability for your use of this information.

## 2019-09-10 LAB
ALBUMIN SERPL-MCNC: 4.7 G/DL (ref 3.5–5.5)
ALBUMIN/GLOB SERPL: 2 {RATIO} (ref 1.2–2.2)
ALP SERPL-CCNC: 37 IU/L (ref 39–117)
ALT SERPL-CCNC: 13 IU/L (ref 0–32)
AST SERPL-CCNC: 17 IU/L (ref 0–40)
BILIRUB SERPL-MCNC: 0.5 MG/DL (ref 0–1.2)
BUN SERPL-MCNC: 16 MG/DL (ref 6–20)
BUN/CREAT SERPL: 21 (ref 9–23)
CALCIUM SERPL-MCNC: 9 MG/DL (ref 8.7–10.2)
CHLORIDE SERPL-SCNC: 104 MMOL/L (ref 96–106)
CHOLEST SERPL-MCNC: 182 MG/DL (ref 100–199)
CO2 SERPL-SCNC: 25 MMOL/L (ref 20–29)
CREAT SERPL-MCNC: 0.75 MG/DL (ref 0.57–1)
ERYTHROCYTE [DISTWIDTH] IN BLOOD BY AUTOMATED COUNT: 12.3 % (ref 12.3–15.4)
EST. AVERAGE GLUCOSE BLD GHB EST-MCNC: 105 MG/DL
GLOBULIN SER CALC-MCNC: 2.4 G/DL (ref 1.5–4.5)
GLUCOSE SERPL-MCNC: 87 MG/DL (ref 65–99)
HBA1C MFR BLD: 5.3 % (ref 4.8–5.6)
HCT VFR BLD AUTO: 36.1 % (ref 34–46.6)
HDLC SERPL-MCNC: 60 MG/DL
HGB BLD-MCNC: 12.7 G/DL (ref 11.1–15.9)
LDLC SERPL CALC-MCNC: 113 MG/DL (ref 0–99)
MCH RBC QN AUTO: 32.1 PG (ref 26.6–33)
MCHC RBC AUTO-ENTMCNC: 35.2 G/DL (ref 31.5–35.7)
MCV RBC AUTO: 91 FL (ref 79–97)
PLATELET # BLD AUTO: 195 X10E3/UL (ref 150–450)
POTASSIUM SERPL-SCNC: 4.1 MMOL/L (ref 3.5–5.2)
PROT SERPL-MCNC: 7.1 G/DL (ref 6–8.5)
RBC # BLD AUTO: 3.96 X10E6/UL (ref 3.77–5.28)
SODIUM SERPL-SCNC: 139 MMOL/L (ref 134–144)
TRIGL SERPL-MCNC: 47 MG/DL (ref 0–149)
TSH SERPL DL<=0.005 MIU/L-ACNC: 1.74 UIU/ML (ref 0.45–4.5)
VLDLC SERPL CALC-MCNC: 9 MG/DL (ref 5–40)
WBC # BLD AUTO: 5.4 X10E3/UL (ref 3.4–10.8)

## 2019-09-24 ENCOUNTER — OFFICE VISIT (OUTPATIENT)
Dept: INTERNAL MEDICINE CLINIC | Age: 39
End: 2019-09-24

## 2019-09-24 VITALS
OXYGEN SATURATION: 99 % | DIASTOLIC BLOOD PRESSURE: 80 MMHG | WEIGHT: 187 LBS | HEIGHT: 68 IN | TEMPERATURE: 97.5 F | SYSTOLIC BLOOD PRESSURE: 112 MMHG | HEART RATE: 94 BPM | RESPIRATION RATE: 16 BRPM | BODY MASS INDEX: 28.34 KG/M2

## 2019-09-24 DIAGNOSIS — H69.83 EUSTACHIAN TUBE DYSFUNCTION, BILATERAL: Primary | ICD-10-CM

## 2019-09-24 NOTE — PATIENT INSTRUCTIONS
Eustachian Tube Problems: Care Instructions  Your Care Instructions    The eustachian (say \"you-STAY-shee-un\") tubes run between the inside of the ears and the throat. They keep air pressure stable in the ears. If your eustachian tubes become blocked, the air pressure in your ears changes. The fluids from a cold can clog eustachian tubes, causing pain in the ears. A quick change in air pressure can cause eustachian tubes to close up. This might happen when an airplane changes altitude or when a  goes up or down underwater. Eustachian tube problems often clear up on their own or after antibiotic treatment. If your tubes continue to be blocked, you may need surgery. Follow-up care is a key part of your treatment and safety. Be sure to make and go to all appointments, and call your doctor if you are having problems. It's also a good idea to know your test results and keep a list of the medicines you take. How can you care for yourself at home? · To ease ear pain, apply a warm washcloth or a heating pad set on low. There may be some drainage from the ear when the heat melts earwax. Put a cloth between the heat source and your skin. Do not use a heating pad with children. · If your doctor prescribed antibiotics, take them as directed. Do not stop taking them just because you feel better. You need to take the full course of antibiotics. · Your doctor may recommend over-the-counter medicine. Be safe with medicines. Oral or nasal decongestants may relieve ear pain. Avoid decongestants that are combined with antihistamines, which tend to cause more blockage. But if allergies seem to be the problem, your doctor may recommend a combination. Be careful with cough and cold medicines. Don't give them to children younger than 6, because they don't work for children that age and can even be harmful. For children 6 and older, always follow all the instructions carefully.  Make sure you know how much medicine to give and how long to use it. And use the dosing device if one is included. When should you call for help? Call your doctor now or seek immediate medical care if:    · You develop sudden, complete hearing loss.     · You have severe pain or feel dizzy.     · You have new or increasing pus or blood draining from your ear.     · You have redness, swelling, or pain around or behind the ear.    Watch closely for changes in your health, and be sure to contact your doctor if:    · You do not get better after 2 weeks.     · You have any new symptoms, such as itching or a feeling of fullness in the ear. Where can you learn more? Go to http://babatunde-nichelle.info/. Enter Y822 in the search box to learn more about \"Eustachian Tube Problems: Care Instructions. \"  Current as of: October 21, 2018  Content Version: 12.2  © 7862-2590 [employer]. Care instructions adapted under license by [employer] (which disclaims liability or warranty for this information). If you have questions about a medical condition or this instruction, always ask your healthcare professional. [address] any warranty or liability for your use of this information.

## 2019-09-24 NOTE — PROGRESS NOTES
Jez Jaeger is a 46 y.o. female who was seen in clinic today (9/24/2019) for an acute visit. Assessment & Plan:     Diagnoses and all orders for this visit:    1. Eustachian tube dysfunction, bilateral- this is a new problem, symptoms are: not changed, differential dx reviewed with the patient, and this is likely etiology. Reassured no signs of otitis media or cerumen impaction. Will treat with: Flonase, sudafed, and saline rinses. Red flags were reviewed with the patient to RTC or notify me, expected time course for resolution reviewed. See AVS.          Follow-up and Dispositions    · Return if symptoms worsen or fail to improve. Subjective: Nannette was seen today for Ear Pain and Elbow Pain    URI Review  Nannette returns to clinic today to talk about: bilateral ear fullness/pressure for 4 days ago, which is fluctuating since that time, currently slightly worse. She reports tinnitus and decreased hearing. She denies a history of: fever, headache, sinus congestion, post nasal drip, rhinorrhea and chest congestion. Treatments have included: Sudafed which have been temporarily effective, maybe. Relevant PMH: No pertinent additional PMH. Patient reports sick contacts: no.         Prior to Admission medications    Medication Sig Start Date End Date Taking? Authorizing Provider   multivitamin (ONE A DAY) tablet Take 1 Tab by mouth daily. Yes Provider, Historical   fluticasone propionate (FLONASE) 50 mcg/actuation nasal spray 2 Sprays by Both Nostrils route daily. 4/30/19   Fiorella Estevez MD          Allergies   Allergen Reactions    Iodinated Contrast Media Hives           ROS - per HPI        Objective:   Physical Exam   Constitutional: No distress. HENT:   Right Ear: Tympanic membrane is not erythematous and not bulging. A middle ear effusion (minimal) is present. Left Ear: Tympanic membrane is not erythematous and not bulging. No middle ear effusion.    Nose: No mucosal edema or rhinorrhea. Right sinus exhibits no maxillary sinus tenderness and no frontal sinus tenderness. Left sinus exhibits no maxillary sinus tenderness and no frontal sinus tenderness. Mouth/Throat: Uvula is midline and mucous membranes are normal. No oropharyngeal exudate or posterior oropharyngeal erythema. Eyes: Conjunctivae are normal. No scleral icterus. Neck: Neck supple. Cardiovascular: Regular rhythm and normal heart sounds. No murmur heard. Pulmonary/Chest: Effort normal and breath sounds normal. She has no wheezes. She has no rales. Lymphadenopathy:     She has no cervical adenopathy. Visit Vitals  /80   Pulse 94   Temp 97.5 °F (36.4 °C) (Oral)   Resp 16   Ht 5' 8.25\" (1.734 m)   Wt 187 lb (84.8 kg)   LMP 09/19/2019   SpO2 99%   BMI 28.23 kg/m²         Disclaimer:  Advised her to call back or return to office if symptoms worsen/change/persist.  Discussed expected course/resolution/complications of diagnosis in detail with patient. Medication risks/benefits/costs/interactions/alternatives discussed with patient. She was given an after visit summary which includes diagnoses, current medications, & vitals. She expressed understanding with the diagnosis and plan. Aspects of this note may have been generated using voice recognition software. Despite editing, there may be some syntax errors.        Preston Palma MD

## 2019-09-26 ENCOUNTER — TELEPHONE (OUTPATIENT)
Dept: INTERNAL MEDICINE CLINIC | Age: 39
End: 2019-09-26

## 2019-09-26 DIAGNOSIS — H69.83 EUSTACHIAN TUBE DYSFUNCTION, BILATERAL: Primary | ICD-10-CM

## 2019-09-26 RX ORDER — METHYLPREDNISOLONE 4 MG/1
TABLET ORAL
Qty: 1 DOSE PACK | Refills: 0 | Status: SHIPPED | OUTPATIENT
Start: 2019-09-26 | End: 2019-10-02

## 2019-09-26 NOTE — TELEPHONE ENCOUNTER
Pt says that she saw dr Tatum Carnes on tues for ear pain and he told her to call back if she got worse and it has.

## 2019-09-26 NOTE — TELEPHONE ENCOUNTER
Patient reports her right ear has not improved, gotten worse, she has difficulty hearing, built up pressure and is feeling nauseated and dizzy and has some ringing in her ears. Her pain is 4/10 and she has tried everything Dr. Stafford Bolds recommended without relief. She is going out of town tomorrow afternoon, please let her know if there is anything else she can do. Aware Dr. Stafford Bolds out of the office and we will call her back tomorrow morning with a recommendation.

## 2019-10-03 RX ORDER — AMOXICILLIN AND CLAVULANATE POTASSIUM 875; 125 MG/1; MG/1
1 TABLET, FILM COATED ORAL EVERY 12 HOURS
Qty: 14 TAB | Refills: 0 | Status: SHIPPED | OUTPATIENT
Start: 2019-10-03 | End: 2019-10-10

## 2019-11-04 ENCOUNTER — OFFICE VISIT (OUTPATIENT)
Dept: URGENT CARE | Age: 39
End: 2019-11-04

## 2019-11-04 VITALS
WEIGHT: 190 LBS | SYSTOLIC BLOOD PRESSURE: 120 MMHG | DIASTOLIC BLOOD PRESSURE: 71 MMHG | HEIGHT: 68 IN | RESPIRATION RATE: 18 BRPM | TEMPERATURE: 98.8 F | HEART RATE: 86 BPM | OXYGEN SATURATION: 98 % | BODY MASS INDEX: 28.79 KG/M2

## 2019-11-04 DIAGNOSIS — S86.811A STRAIN OF CALF MUSCLE, RIGHT, INITIAL ENCOUNTER: Primary | ICD-10-CM

## 2019-11-04 NOTE — PROGRESS NOTES
Leg Pain   This is a new problem. The current episode started more than 1 week ago. The problem occurs daily. The problem has not changed since onset. Associated symptoms comments: Rt mid/ lower calf pain  Started after 1 week before after running on street with new type of stroller  Pain increases  after prolong sitting and resolves with activity. Treatments tried: compression / stocking  The treatment provided moderate (pain returns when removes stocking ) relief. Past Medical History:   Diagnosis Date    Depression 10/6/2014        Past Surgical History:   Procedure Laterality Date    HX COLONOSCOPY N/A 02/12/2010    normal; bx colon-lymphoid aggregates, bx ileum- no diagnostic features    HX ORTHOPAEDIC Right 1998    wrist arthroscopy         Family History   Problem Relation Age of Onset    High Cholesterol Mother     High Cholesterol Father     High Cholesterol Brother     High Cholesterol Brother     No Known Problems Brother     No Known Problems Daughter     No Known Problems Son         Social History     Socioeconomic History    Marital status:      Spouse name: Not on file    Number of children: Not on file    Years of education: Not on file    Highest education level: Not on file   Occupational History    Not on file   Social Needs    Financial resource strain: Not on file    Food insecurity:     Worry: Not on file     Inability: Not on file    Transportation needs:     Medical: Not on file     Non-medical: Not on file   Tobacco Use    Smoking status: Never Smoker    Smokeless tobacco: Never Used   Substance and Sexual Activity    Alcohol use:  Yes     Alcohol/week: 1.0 standard drinks     Types: 1 Standard drinks or equivalent per week     Frequency: 2-4 times a month     Drinks per session: 1 or 2     Binge frequency: Never    Drug use: No    Sexual activity: Yes     Partners: Male     Birth control/protection: Surgical     Comment:  had vasectomy Lifestyle    Physical activity:     Days per week: Not on file     Minutes per session: Not on file    Stress: Not on file   Relationships    Social connections:     Talks on phone: Not on file     Gets together: Not on file     Attends Jain service: Not on file     Active member of club or organization: Not on file     Attends meetings of clubs or organizations: Not on file     Relationship status: Not on file    Intimate partner violence:     Fear of current or ex partner: Not on file     Emotionally abused: Not on file     Physically abused: Not on file     Forced sexual activity: Not on file   Other Topics Concern    Not on file   Social History Narrative    Not on file                ALLERGIES: Iodinated contrast media    Review of Systems   Musculoskeletal: Negative for gait problem. All other systems reviewed and are negative. Vitals:    11/04/19 0934   BP: 120/71   Pulse: 86   Resp: 18   Temp: 98.8 °F (37.1 °C)   SpO2: 98%   Weight: 190 lb (86.2 kg)   Height: 5' 8.25\" (1.734 m)       Physical Exam   Constitutional: No distress. Musculoskeletal:        Right hip: Normal.        Right knee: Normal.        Right ankle: Normal.        Right lower leg: She exhibits no tenderness, no swelling and no edema. Legs:  Nursing note and vitals reviewed. MDM    Procedures             ICD-10-CM ICD-9-CM    1. Strain of calf muscle, right, initial encounter S86.811A 844.8      Reassured  Calf stretching- nsaid/ magnesium oxide  Follow with PCP    No orders of the defined types were placed in this encounter. No results found for any visits on 11/04/19. The patients condition was discussed with the patient and they understand. The patient is to follow up with primary care doctor. If signs and symptoms become worse the pt is to go to the ER. The patient is to take medications as prescribed.

## 2019-11-04 NOTE — PATIENT INSTRUCTIONS
Calf Strain: Rehab Exercises  Introduction  Here are some examples of exercises for you to try. The exercises may be suggested for a condition or for rehabilitation. Start each exercise slowly. Ease off the exercises if you start to have pain. You will be told when to start these exercises and which ones will work best for you. How to do the exercises  Calf wall stretch (back knee straight)    1. Stand facing a wall with your hands on the wall at about eye level. Put your affected leg about a step behind your other leg. 2. Keeping your back leg straight and your back heel on the floor, bend your front knee and gently bring your hip and chest toward the wall until you feel a stretch in the calf of your back leg. 3. Hold the stretch for at least 15 to 30 seconds. 4. Repeat 2 to 4 times. Calf wall stretch (knees bent)    1. Stand facing a wall with your hands on the wall at about eye level. Put your affected leg about a step behind your other leg. 2. Keeping both heels on the floor, bend both knees. Then gently bring your hip and chest toward the wall until you feel a stretch in the calf of your back leg. 3. Hold the stretch for at least 15 to 30 seconds. 4. Repeat 2 to 4 times. Bilateral calf stretch (knees straight)    1. Place a book on the floor a few inches from a wall or countertop, and put the balls of your feet on it. Your heels should be on the floor. The book needs to be thick enough so that you can feel a gentle stretch in each calf. If you are not steady on your feet, hold on to a chair, counter, or wall while you do this stretch. 2. Keep your knees straight, and lean forward until you feel a stretch in each calf. 3. To get more stretch, add another book or use a thicker book, such as a phone book, a dictionary, or an encyclopedia. 4. Hold the stretch for at least 15 to 30 seconds. 5. Repeat 2 to 4 times. Bilateral calf stretch (knees bent)    1.  Place a book on the floor a few inches from a wall or countertop, and put the balls of your feet on it. Your heels should be on the floor. The book needs to be thick enough so that you can feel a gentle stretch in each calf. If you are not steady on your feet, hold on to a chair, counter, or wall while you do this stretch. 2. Bend your knees, and lean forward until you feel a stretch in each calf. 3. To get more stretch, add another book or use a thicker book, such as a phone book, a dictionary, or an encyclopedia. 4. Hold the stretch for at least 15 to 30 seconds. 5. Repeat 2 to 4 times. Ankle plantarflexion    1. Sit with your affected leg straight and supported on the floor. Your other leg should be bent, with that foot flat on the floor. 2. Keeping your affected leg straight, gently flex your foot downward so your toes are pointed away from your body. Then slowly relax your foot to the starting position. 3. Repeat 8 to 12 times. Ankle dorsiflexion    1. Sit with your affected leg straight and supported on the floor. Your other leg should be bent, with that foot flat on the floor. 2. Keeping your leg straight, gently flex your foot back so your toes point upward. Then slowly relax your foot to the starting position. 3. Repeat 8 to 12 times. Bilateral heel raises on step    1. Stand on the bottom step of a staircase, facing up toward the stairs. Put the balls of your feet on the step. If you are not steady on your feet, hold on to the banister or wall. 2. Keeping both knees straight, slowly lift your heels above the step so that you are standing on your toes. Then slowly lower your heels below the step and toward the floor. 3. Return to the starting position, with your feet even with the step. 4. Repeat 8 to 12 times. Follow-up care is a key part of your treatment and safety. Be sure to make and go to all appointments, and call your doctor if you are having problems.  It's also a good idea to know your test results and keep a list of the medicines you take. Where can you learn more? Go to http://babatunde-nichelle.info/. Enter N086 in the search box to learn more about \"Calf Strain: Rehab Exercises. \"  Current as of: June 26, 2019  Content Version: 12.2  © 0330-9027 [employer]. Care instructions adapted under license by [employer] (which disclaims liability or warranty for this information). If you have questions about a medical condition or this instruction, always ask your healthcare professional. [address] any warranty or liability for your use of this information.

## 2020-01-20 DIAGNOSIS — M25.521 RIGHT ELBOW PAIN: Primary | ICD-10-CM

## 2021-09-27 ENCOUNTER — OFFICE VISIT (OUTPATIENT)
Dept: INTERNAL MEDICINE CLINIC | Age: 41
End: 2021-09-27
Payer: COMMERCIAL

## 2021-09-27 VITALS
BODY MASS INDEX: 21.76 KG/M2 | HEIGHT: 68 IN | TEMPERATURE: 98.4 F | DIASTOLIC BLOOD PRESSURE: 60 MMHG | OXYGEN SATURATION: 98 % | WEIGHT: 143.6 LBS | HEART RATE: 94 BPM | RESPIRATION RATE: 14 BRPM | SYSTOLIC BLOOD PRESSURE: 100 MMHG

## 2021-09-27 DIAGNOSIS — F51.01 PRIMARY INSOMNIA: Primary | ICD-10-CM

## 2021-09-27 PROCEDURE — 99213 OFFICE O/P EST LOW 20 MIN: CPT | Performed by: INTERNAL MEDICINE

## 2021-09-27 RX ORDER — ZOLPIDEM TARTRATE 5 MG/1
2.5 TABLET ORAL
Qty: 15 TABLET | Refills: 0 | Status: SHIPPED | OUTPATIENT
Start: 2021-09-27 | End: 2021-11-22 | Stop reason: SDUPTHER

## 2021-09-27 NOTE — PROGRESS NOTES
Acute Care Note    Jeanie Silva is 37 y.o. female. she presents for evaluation of Sleep Problem      She has had difficulty sleeping for nearly 5 days. She notes that she is a  and has recently taken on a new class for which she has to wake up at [address].  She intially started with some difficulty sleeping. She reports that now, she reports feeling anxious regarding being able to sleep in preparation for that class. She reports that since this has been ongoing, she has felt more anxiety regarding sleep in general.  She has slept no more than 2 hours a night in the past 3 nights. She has not had significant insomnia in the past. She has not had any medication or supplement changes. Prior to Admission medications    Medication Sig Start Date End Date Taking? Authorizing Provider   multivitamin (ONE A DAY) tablet Take 1 Tab by mouth daily. Yes Provider, Historical   fluticasone propionate (FLONASE) 50 mcg/actuation nasal spray 2 Sprays by Both Nostrils route daily. 4/30/19   Bryce New MD         Patient Active Problem List   Diagnosis Code    Recurrent major depressive disorder ([facility] F33.9    Anxiety F41.9         Review of Systems   Constitutional: Negative. Respiratory: Negative. Cardiovascular: Negative. Psychiatric/Behavioral: The patient is nervous/anxious and has insomnia. Visit Vitals  /60 (BP 1 Location: Left arm, BP Patient Position: Sitting, BP Cuff Size: Adult)   Pulse 94   Temp 98.4 °F (36.9 °C) (Temporal)   Resp 14   Ht 5' 8.25\" (1.734 m)   Wt 143 lb 9.6 oz (65.1 kg)   SpO2 98%   BMI 21.67 kg/m²       Physical Exam  Constitutional:       Appearance: Normal appearance. Cardiovascular:      Rate and Rhythm: Normal rate and regular rhythm. Pulses: Normal pulses. Heart sounds: Normal heart sounds. Neurological:      Mental Status: She is alert. ASSESSMENT/PLAN  Diagnoses and all orders for this visit:    1.  Primary insomnia - Discussed with the patient that while her insomnia may be a primary insomnia, it appears that she has a significant component of anxiety contributing at this time. I advised she try a small dose of ambien and consider changing her schedule. If symptoms continue, she will return to the clinic.   -     zolpidem (AMBIEN) 5 mg tablet; Take 0.5 Tablets by mouth nightly as needed for Sleep. Max Daily Amount: 2.5 mg. Advised the patient to call back or return to office if symptoms worsen/change/persist.   Discussed expected course/resolution/complications of diagnosis in detail with patient. Medication risks/benefits/costs/interactions/alternatives discussed with patient. The patient was given an after visit summary which includes diagnoses, current medications, & vitals. They expressed understanding with the diagnosis and plan.

## 2021-09-27 NOTE — PROGRESS NOTES
Chief Complaint   Patient presents with    Sleep Problem     Reviewed record in preparation for visit and have obtained necessary documentation. Identified pt with two pt identifiers(name and ).       Health Maintenance Due   Topic    Hepatitis C Screening          Chief Complaint   Patient presents with    Sleep Problem        Wt Readings from Last 3 Encounters:   21 143 lb 9.6 oz (65.1 kg)   19 190 lb (86.2 kg)   19 187 lb (84.8 kg)     Temp Readings from Last 3 Encounters:   21 98.4 °F (36.9 °C) (Temporal)   19 98.8 °F (37.1 °C)   19 97.5 °F (36.4 °C) (Oral)     BP Readings from Last 3 Encounters:   21 100/60   19 120/71   19 112/80     Pulse Readings from Last 3 Encounters:   21 94   19 86   19 94           Learning Assessment:  :     Learning Assessment 2014   PRIMARY LEARNER Patient   HIGHEST LEVEL OF EDUCATION - PRIMARY LEARNER  > 4 YEARS OF COLLEGE   BARRIERS PRIMARY LEARNER OTHER   CO-LEARNER CAREGIVER Yes   CO-LEARNER NAME [address] LEVEL OF EDUCATION > 4 YEARS OF COLLEGE   BARRIERS CO-LEARNER OTHER   PRIMARY LANGUAGE ENGLISH   PRIMARY LANGUAGE CO-LEARNER ENGLISH    NEED No   LEARNER PREFERENCE PRIMARY DEMONSTRATION   LEARNER PREFERENCE CO-LEARNER DEMONSTRATION   ANSWERED BY Patient   RELATIONSHIP SELF       Depression Screening:  :     3 most recent PHQ Screens 2019   Little interest or pleasure in doing things Not at all   Feeling down, depressed, irritable, or hopeless Not at all   Total Score PHQ 2 0   Trouble falling or staying asleep, or sleeping too much Several days   Feeling tired or having little energy Several days   Poor appetite, weight loss, or overeating Not at all   Feeling bad about yourself - or that you are a failure or have let yourself or your family down Not at all   Trouble concentrating on things such as school, work, reading, or watching TV Not at all Moving or speaking so slowly that other people could have noticed; or the opposite being so fidgety that others notice Not at all   Thoughts of being better off dead, or hurting yourself in some way Not at all   PHQ 9 Score 2   How difficult have these problems made it for you to do your work, take care of your home and get along with others Not difficult at all       Fall Risk Assessment:  :     No flowsheet data found. Abuse Screening:  :     Abuse Screening Questionnaire 9/9/2019 6/11/2018   Do you ever feel afraid of your partner? N N   Are you in a relationship with someone who physically or mentally threatens you? N N   Is it safe for you to go home? Y Y       Coordination of Care Questionnaire:  :     1) Have you been to an emergency room, urgent care clinic since your last visit? no   Hospitalized since your last visit? no             2) Have you seen or consulted any other health care providers outside of [address] [place] [address] since your last visit? no  (Include any pap smears or colon screenings in this section.)    3) Do you have an Advance Directive on file? no    4) Are you interested in receiving information on Advance Directives? NO      Patient is accompanied by self I have received verbal consent from Eva Kaur to discuss any/all medical information while they are present in the room. Reviewed record  In preparation for visit and have obtained necessary documentation.

## 2021-09-29 ENCOUNTER — E-VISIT (OUTPATIENT)
Dept: OTHER | Age: 41
End: 2021-09-29
Payer: COMMERCIAL

## 2021-09-29 ENCOUNTER — TELEPHONE (OUTPATIENT)
Dept: INTERNAL MEDICINE CLINIC | Age: 41
End: 2021-09-29

## 2021-09-29 DIAGNOSIS — F51.04 PSYCHOPHYSIOLOGICAL INSOMNIA: Primary | ICD-10-CM

## 2021-09-29 PROCEDURE — 99212 OFFICE O/P EST SF 10 MIN: CPT | Performed by: INTERNAL MEDICINE

## 2021-09-29 NOTE — TELEPHONE ENCOUNTER
RC to patient, advised I saw where Dr. Rosa Denis had sent her a LinguaNext message. She is asking if she can take L-theanine with Ambien. Told her it was mentioned in her note to Dr. Rosa Denis but not specifically asking that question. That I would forward this on to him. She plans to take 5 mg of Ambien tonight.

## 2021-09-29 NOTE — TELEPHONE ENCOUNTER
Reason for call:  Patient sent messages to Dr. Chris Somers and Dr. Oneyda Gonzales today concerning not being able to sleep -- says she has not slept since last week. I told her Dr. Chris Somers is not in the office today, but that the nurse forwarded the messages to Dr. Oneyda Gonzales. Asked if someone could please call her today.     Is this a new problem: no     Date of last appointment:  9/27/2021     Can we respond via [facility] [employer] [facility]: no    Best call back number: [phone]

## 2021-09-30 RX ORDER — BUSPIRONE HYDROCHLORIDE 7.5 MG/1
7.5 TABLET ORAL 2 TIMES DAILY
Qty: 60 TABLET | Refills: 0 | Status: SHIPPED | OUTPATIENT
Start: 2021-09-30 | End: 2022-01-03

## 2021-09-30 NOTE — TELEPHONE ENCOUNTER
She can try both. However, if the [employer] is not working this is going to be hard to treat medically. She needs to eliminate the underlining stress that is driving this.

## 2021-10-01 ENCOUNTER — OFFICE VISIT (OUTPATIENT)
Dept: SLEEP MEDICINE | Age: 41
End: 2021-10-01
Payer: COMMERCIAL

## 2021-10-01 VITALS
BODY MASS INDEX: 21.67 KG/M2 | DIASTOLIC BLOOD PRESSURE: 60 MMHG | WEIGHT: 143 LBS | HEIGHT: 68 IN | SYSTOLIC BLOOD PRESSURE: 100 MMHG

## 2021-10-01 DIAGNOSIS — G47.61 PERIODIC LIMB MOVEMENTS OF SLEEP: ICD-10-CM

## 2021-10-01 DIAGNOSIS — Z86.59 H/O MAJOR DEPRESSION: ICD-10-CM

## 2021-10-01 DIAGNOSIS — G47.419 NARCOLEPSY WITHOUT CATAPLEXY: Primary | ICD-10-CM

## 2021-10-01 DIAGNOSIS — G47.8 SLEEP PARALYSIS: ICD-10-CM

## 2021-10-01 PROCEDURE — 99204 OFFICE O/P NEW MOD 45 MIN: CPT | Performed by: INTERNAL MEDICINE

## 2021-10-01 NOTE — PROGRESS NOTES
[address] [facility] [place]. [place], [address]  Tel.  [phone]  Fax. 100 [facility] 60  [place], [address] [place]  Tel.  [phone]  Fax. [phone] [address]  Tel.  [phone]  Fax. 62 Liudmila Ely is a 37y.o. year old female seen for evaluation of a sleep disorder. ASSESSMENT/PLAN:      ICD-10-CM ICD-9-CM    1. Narcolepsy without cataplexy  G47.419 347.00 POLYSOMNOGRAPHY 1 NIGHT      MULTIPLE SLEEP LATENCY TEST      DRUG ABUSE PROF, URINE (SEVEN DRUGS), MS COFIRM      DRUG ABUSE PROF, URINE (SEVEN DRUGS), MS COFIRM   2. Periodic limb movements of sleep  G47.61 327.51    3. Sleep paralysis  G47.8 780.58    4. H/O major depression  Z86.59 V11.8    5. BMI 21.0-21.9, adult  Z68.21 V85.1        Patient has a history and examination consistent with the diagnosis of sleep apnea. Follow-up and Dispositions    · Return for telephone follow-up after testing is completed. * The patient currently has a Minimal risk for having sleep apnea. STOP-BANG score 1.    * interaction between mood disorder and insomnia discussed. * Since narcolepsy may present as insomnia, sleep testing was ordered for initial evaluation. * Role of exercise and mental stimulation in promoting wakefulness discussed. * Counseling was provided regarding proper sleep hygiene to include but not limited to effect of multi-media interaction in sleep environment and of the need to use the bed only for sleeping. * Counseling was also provided regarding age appropriate sleep needs and sleep environment safety. Components of CBT-I,  namely paradoxical intention and stimulus control therapy were reviewed.                  Orders Placed This Encounter    MULTIPLE SLEEP LATENCY TEST     Standing Status:   Future     Standing Expiration Date:   10/1/2022    DRUG ABUSE PROF, URINE (SEVEN DRUGS), MS COFIRM     Standing Status:   Future Number of Occurrences:   1     Standing Expiration Date:   4/1/2022    POLYSOMNOGRAPHY 1 NIGHT     Order Specific Question:   Reason for Exam     Answer:   Naracolepsy       * She was provided information on sleep apnea including corresponding risk factors and the importance of proper treatment. * Treatment options were reviewed in detail. She would like to proceed with PAP therapy. Patient will be seen in follow-up in 6-8 weeks after PAP setup to gauge treatment response and adherence to therapy. * The patient was counseled regarding proper sleep hygiene, with emphasis on ensuring sufficient total sleep time; safe driving and the benefits of exercise and weight loss. * All of her questions were addressed. SUBJECTIVE/OBJECTIVE:    Vandana Jennings is an 37 y.o. female referred for evaluation for a sleep disorder. 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 She has been prescribed Ambien 5 mg which seems to help her initiate sleep but does not help her stay asleep. She seems to have lost appetite and weight in recent weeks. Symptoms began 2 months ago, rapidly worsening since that time. She usually gets in bed 9:30 pm and reads in bed and there until sleep onset at a variable hour. She will wake up at about 2:30 am and is unable to return to sleep. She will remain in bed and reads her book or stretches on the carpet or tries to mediate. Family or house members do not note snoring. She denies of symptoms indicative of cataplexy or sleep related hallucinations. She does reports of sleep paralysis. She reports of a history of jerking movement of legs that wakes her up from sleep. Nannette Mccartney (P) does wake up frequently at night. She (P) is bothered by waking up too early and left unable to get back to sleep.  She actually sleeps about (P) 2 hours at night and wakes up about (P) 1 times during the night. She (P) does not work shifts: Josef Sachin Brunson indicates she (P) does get too little sleep at night. Her bedtime is (P) 2200. She awakens at (P) 0600. She (P) does not take naps. She has the following observed behaviors: (P) Not applicable;  . Other remarks: The patient has not undergone diagnostic testing for the current problems. Review of Systems:  Constitutional:  No significant weight loss or weight gain  Eyes:  No blurred vision  CVS:  No significant chest pain  Pulm:  No significant shortness of breath  GI:  Significant nausea but no vomiting  :  Significant nocturia  Musculoskeletal:  No significant joint pain at night  Skin:  No significant rashes  Neuro:  No significant dizziness   Psych:  No active mood issues    Sleep Review of Systems: notable for Positive difficulty falling asleep; Positive awakenings at night; Positive perceived regular dreaming; Negative nightmares; Positive  early morning headaches; Positive  memory problems; Positive  concentration issues; Negative caffeine;  Negative alcohol;   Negative history of any automobile or occupational accidents due to daytime drowsiness. [place] Sleepiness Score: (P) 22   and Modified F.O.S.Q. Score Total / 2: (P) 6    Patient-Reported Vitals 10/1/2021   Patient-Reported Weight 140   Patient-Reported Height 5'9\"   Patient-Reported Pulse 66   Patient-Reported Temperature 97.9   Patient-Reported SpO2 99   Patient-Reported LMP 9/30/2021       Physical Exam completed by visual and auditory observation of patient with verbal input from patient.     General:   Alert, oriented, not in acute distress   Eyes:  Anicteric Sclerae; no obvious strabismus   Nose:  No obvious nasal septum deviation    Neck:   Midline trachea, no visible mass   Chest/Lungs:  Respiratory effort normal, no visualized signs of difficulty breathing or respiratory distress   CVS:  No JVD   Extremities:  No obvious rashes noted on face, neck, or hands   Neuro:  No facial asymmetry, no focal deficits; no obvious tremor    Psych:  Normal affect,  normal countenance     Nannettetata Mccartney is being evaluated by a Virtual Visit (video visit) encounter to address concerns as mentioned above. A caregiver was present when appropriate. Due to this being a TeleHealth encounter (During NXMAT-83 public health emergency), evaluation of the following organ systems was limited: Vitals/Constitutional/EENT/Resp/CV/GI//MS/Neuro/Skin/Heme-Lymph-Imm. Pursuant to the emergency declaration under the [address] [place] [address], [address] [place] [address] and the [employer] Act, this Virtual Visit was conducted with patient's (and/or legal guardian's) consent, to reduce the patient's risk of exposure to COVID-19 and provide necessary medical care. Patient identification was verified at the start of the visit: YES using name and date of birth. Patient's phone number [phone] (home)  was confirmed for accuracy. She gives permission for messages regarding results and appointments to be left at that number. Services were provided through a video synchronous discussion virtually to substitute for in-person clinic visit. I was at home while conducting this encounter, patient located at their home or alternate location of their choice. An electronic signature was used to authenticate this note. Babita Reyes MD, FAASM  Diplomate American Board of Sleep Medicine  Diplomate in Sleep Medicine - ABP    Electronically signed.  10/01/21

## 2021-10-01 NOTE — PATIENT INSTRUCTIONS
[address] [place]. [place], [address]  Tel.  [phone]  Fax. 100 [facility] 60  [place], [address]  Tel.  [phone]  Fax. [phone] [address]  Tel.  [phone]  Fax. [phone]       Narcolepsy: After Your Visit  Your Care Instructions  Everybody gets a little sleepy once in a while, during a long car ride or other times when you want to be alert. But some people cannot control their sleepiness. It is no fun to be in the middle of your workday or driving your car down the street and have an overwhelming desire to sleep. This condition is called narcolepsy. Doctors do not know what causes narcolepsy. Your doctor may ask you to keep a sleep diary for a couple of weeks. It will help you and your doctor decide on treatment. It often helps to take limited naps during the day. It also helps to create a good mood and place for nighttime sleep. Your doctor may recommend medicine to help you stay awake during the day or sleep at night. Follow-up care is a key part of your treatment and safety. Be sure to make and go to all appointments, and call your doctor if you are having problems. It's also a good idea to know your test results and keep a list of the medicines you take. How can you care for yourself at home? · Try to take 2 or 3 short naps at regular times during the day. After a nap, always give yourself time to become alert before you drive a car or do anything that might cause an accident. · Take your medicines exactly as prescribed. Call your doctor if you think you are having a problem with your medicine. You may need to try several medicines before you find the one that works best for you. · Try to improve your nighttime sleep habits. Here are a few of the things you could do:  ¨ Go to bed only when you are sleepy, and get up at the same time every day, even if you do not feel rested.  This might help you sleep well the next night and the night after that. ¨ If you lie awake for longer than 15 minutes, get up, leave the bedroom, and do something quiet, such as read, until you feel sleepy again. ¨ Avoid drinking or eating anything with caffeine after 3 p.m. This includes coffee, tea, cola drinks, and chocolate. ¨ Make sure your bedroom is not too hot or too cold, and keep it quiet and dark. ¨ Make sure your mattress provides good support. · Be kind to your body:  ¨ Relieve tension with exercise or a massage. ¨ Learn and do relaxation techniques. ¨ Avoid alcohol, caffeine, nicotine, and illegal drugs. They can increase your anxiety level and cause sleep problems. · Get light exercise daily. Gentle stretching, light aerobics, swimming, walking, and riding a bicycle can help to keep you going during the day and to sleep well at night. · Eat a healthy diet. You may feel better if you avoid heavy meals and eat more fruits and vegetables. · Do not use over-the-counter sleeping pills. They can make your sleep restless. · Ask your doctor if any medicines you take could cause sleepiness. For example, cold and allergy medicines can make you drowsy. · Consider joining a support group with people who have narcolepsy or other sleep problems. These groups can be a good source of tips for what to do. Also, it can be comforting to talk to people who face similar challenges. Your doctor can tell you how to contact a support group. When should you call for help? Call your doctor now or seek immediate medical care if:  · You passed out (lost consciousness). · You cannot use your muscles. This may happen very briefly, sometimes after you laugh or are angry, and may only affect part of your body. Watch closely for changes in your health, and be sure to contact your doctor if:  · Your sleepiness continues to get worse. Where can you learn more?    Go to Your Style Unzipped.be  Enter V069 in the search box to learn more about \"Narcolepsy: After Your Visit. \"   © 1104-1924 Healthwise, Incorporated. Care instructions adapted under license by [address] (which disclaims liability or warranty for this information). This care instruction is for use with your licensed healthcare professional. If you have questions about a medical condition or this instruction, always ask your healthcare professional. [place] [address] any warranty or liability for your use of this information. Content Version: 9.0.31644; Last Revised: September 15, 2009  PROPER SLEEP HYGIENE    What to avoid  · Do not have drinks with caffeine, such as coffee or black tea, for 8 hours before bed. · Do not smoke or use other types of tobacco near bedtime. Nicotine is a stimulant and can keep you awake. · Avoid drinking alcohol late in the evening, because it can cause you to wake in the middle of the night. · Do not eat a big meal close to bedtime. If you are hungry, eat a light snack. · Do not drink a lot of water close to bedtime, because the need to urinate may wake you up during the night. · Do not read or watch TV in bed. Use the bed only for sleeping and sexual activity. What to try  · Go to bed at the same time every night, and wake up at the same time every morning. Do not take naps during the day. · Keep your bedroom quiet, dark, and cool. · Get regular exercise, but not within 3 to 4 hours of your bedtime. .  · Sleep on a comfortable pillow and mattress. · If watching the clock makes you anxious, turn it facing away from you so you cannot see the time. · If you worry when you lie down, start a worry book. Well before bedtime, write down your worries, and then set the book and your concerns aside. · Try meditation or other relaxation techniques before you go to bed. · If you cannot fall asleep, get up and go to another room until you feel sleepy. Do something relaxing.  Repeat your bedtime routine before you go to bed again.  · Make your house quiet and calm about an hour before bedtime. Turn down the lights, turn off the TV, log off the computer, and turn down the volume on music. This can help you relax after a busy day. Drowsy Driving: The [facility] 54 cites drowsiness as a causing factor in more than 776,419 police reported crashes annually, resulting in 76,000 injuries and 1,500 deaths. Other surveys suggest 55% of people polled have driven while drowsy in the past year, 23% had fallen asleep but not crashed, 3% crashed, and 2% had and accident due to drowsy driving. Who is at risk? Young Drivers: One study of drowsy driving accidents states that 55% of the drivers were under 25 years. Of those, 75% were male. Shift Workers and Travelers: People who work overnight or travel across time zones frequently are at higher risk of experiencing Circadian Rhythm Disorders. They are trying to work and function when their body is programed to sleep. Sleep Deprived: Lack of sleep has a serious impact on your ability to pay attention or focus on a task. Consistently getting less than the average of 8 hours your body needs creates partial or cumulative sleep deprivation. Untreated Sleep Disorders: Sleep Apnea, Narcolepsy, R.L.S., and other sleep disorders (untreated) prevent a person from getting enough restful sleep. This leads to excessive daytime sleepiness and increases the risk for drowsy driving accidents by up to 7 times. Medications / Alcohol: Even over the counter medications can cause drowsiness. Medications that impair a drivers attention should have a warning label. Alcohol naturally makes you sleepy and on its own can cause accidents. Combined with excessive drowsiness its effects are amplified.    Signs of Drowsy Driving:   * You don't remember driving the last few miles   * You may drift out of your ines   * You are unable to focus and your thoughts wander   * You may yawn more often than normal   * You have difficulty keeping your eyes open / nodding off   * Missing traffic signs, speeding, or tailgating  Prevention-   Good sleep hygiene, lifestyle and behavioral choices have the most impact on drowsy driving. There is no substitute for sleep and the average person requires 8 hours nightly. If you find yourself driving drowsy, stop and sleep. Consider the sleep hygiene tips provided during your visit as well. Medication Refill Policy: Refills for all medications require 1 week advance notice. Please have your pharmacy fax a refill request. We are unable to fax, or call in \"controled substance\" medications and you will need to pick these prescriptions up from our office. [employer] Activation    Thank you for requesting access to [employer]. Please follow the instructions below to securely access and download your online medical record. [employer] allows you to send messages to your doctor, view your test results, renew your prescriptions, schedule appointments, and more. How Do I Sign Up? 1. In your internet browser, go to [email or web address]. 2. Click on the First Time User? Click Here link in the Sign In box. You will see the New Member Sign Up page. 3. Enter your [employer] Expert Access Code exactly as it appears below. You will not need to use this code after youve completed the sign-up process. If you do not sign up before the expiration date, you must request a new code. [employer] Access Code: Activation code not generated  Current [employer] Expert Status: Active (This is the date your [employer] Expert access code will )    4. Enter the last four digits of your Social Security Number (xxxx) and Date of Birth (mm/dd/yyyy) as indicated and click Submit. You will be taken to the next sign-up page. 5. Create a [employer] Expert ID. This will be your [employer] Expert login ID and cannot be changed, so think of one that is secure and easy to remember. 6. Create a [employer] password.  You can change your password at any time. 7. Enter your Password Reset Question and Answer. This can be used at a later time if you forget your password. 8. Enter your e-mail address. You will receive e-mail notification when new information is available in [address] 9. Click Sign Up. You can now view and download portions of your medical record. 10. Click the Download Summary menu link to download a portable copy of your medical information. Additional Information    If you have questions, please call [phone]. Remember, [employer] is NOT to be used for urgent needs. For medical emergencies, dial 911.

## 2021-10-28 ENCOUNTER — TELEPHONE (OUTPATIENT)
Dept: SLEEP MEDICINE | Age: 41
End: 2021-10-28

## 2021-11-15 ENCOUNTER — TELEPHONE (OUTPATIENT)
Dept: SLEEP MEDICINE | Age: 41
End: 2021-11-15

## 2021-11-15 DIAGNOSIS — G47.33 OSA (OBSTRUCTIVE SLEEP APNEA): Primary | ICD-10-CM

## 2021-11-15 NOTE — TELEPHONE ENCOUNTER
----- Message from Jaqueline Greenfield NP sent at 11/12/2021  8:50 AM EST -----  Regarding: FW: Visit Follow-Up Question  Contact: [phone]    ----- Message -----  From: Thomas Cowan  Sent: 11/12/2021   7:58 AM EST  To: [facility]es [facility]  Subject: RE: Visit Follow-Up Question                     Abdoulaye Hurtado,   I am hoping to reschedule now. .. But I am still unsure if I will sleep in your sleep center. Can we just try the overnight study and not the daytime study? It is so much money out of pocket for me and I got a letter from my insurance saying that the daytime study was not able to be authorized.

## 2021-11-15 NOTE — TELEPHONE ENCOUNTER
An HSAT has been ordered to assess for LUCAS which may be the cause of patient's excessive daytime sleepiness as implied by an [place] Score of 22 at time of initial visit. If HSAT does not indicate presence of LUACS a PSG/MSLT will need to be performed to assess patient's complaint of excessive daytime sleepiness.     Orders Placed This Encounter    SLEEP STUDY UNATTENDED, 4 CHANNEL     Order Specific Question:   Reason for Exam     Answer:   LUCAS

## 2021-11-15 NOTE — TELEPHONE ENCOUNTER
Please advise where to schedule PSG or HSAT, as pt insurance co denied MLST. Pt is very concerned she won't sleep. See message conversations.

## 2021-11-19 ENCOUNTER — OFFICE VISIT (OUTPATIENT)
Dept: INTERNAL MEDICINE CLINIC | Age: 41
End: 2021-11-19

## 2021-11-19 VITALS
HEIGHT: 68 IN | WEIGHT: 142 LBS | HEART RATE: 89 BPM | DIASTOLIC BLOOD PRESSURE: 81 MMHG | BODY MASS INDEX: 21.52 KG/M2 | OXYGEN SATURATION: 98 % | TEMPERATURE: 98.4 F | RESPIRATION RATE: 16 BRPM | SYSTOLIC BLOOD PRESSURE: 122 MMHG

## 2021-11-19 DIAGNOSIS — F33.0 MILD EPISODE OF RECURRENT MAJOR DEPRESSIVE DISORDER (HCC): ICD-10-CM

## 2021-11-19 DIAGNOSIS — F41.9 ANXIETY: ICD-10-CM

## 2021-11-19 DIAGNOSIS — Z00.00 ROUTINE PHYSICAL EXAMINATION: Primary | ICD-10-CM

## 2021-11-19 DIAGNOSIS — F51.04 PSYCHOPHYSIOLOGICAL INSOMNIA: ICD-10-CM

## 2021-11-19 PROCEDURE — 99214 OFFICE O/P EST MOD 30 MIN: CPT | Performed by: INTERNAL MEDICINE

## 2021-11-19 PROCEDURE — 99396 PREV VISIT EST AGE 40-64: CPT | Performed by: INTERNAL MEDICINE

## 2021-11-19 NOTE — ASSESSMENT & PLAN NOTE
Uncontrolled, not sure if this is driving or being driven by her anxiety. Reviewed treatment options, will try Buspar 1/2 tab x 3 days, if SE's continue she will stop and let me know. Reviewed expectations & rational for treatment. She is hesitant to take Rx meds but has tried multiple OTC meds.

## 2021-11-19 NOTE — ASSESSMENT & PLAN NOTE
Uncontrolled, not sure if this is driving or being driven by her anxiety and sleep. Reviewed treatment options. Will defer SSRI and retry Buspar since it will work faster, assuming she tolerates it. Reviewed expectations & rational for treatment. She is hesitant to take Rx meds but has tried multiple OTC meds.

## 2021-11-19 NOTE — ASSESSMENT & PLAN NOTE
New dx as for 2 month ago, no obvious triggers, not sure if this is driving her anxiety or if this is driving anxiety. We reviewed tx options. Encouraged to try 1.5 tabs of Ambien. Continue to work on sleep hygiene. Do not lay in bed if she can't sleep. Can add in melatonin.

## 2021-11-19 NOTE — PROGRESS NOTES
Matt Hernández is a 37 y.o. female who was seen in clinic today (11/19/2021) for a full physical.       Assessment & Plan:   Below is the assessment and plan developed based on review of pertinent history, physical exam, labs, studies, and medications. 1. Routine physical examination  Comments:  TSH is wnl, will check labs below. Reviewed I think palable aorta is due to body size, not anything concerning, offered imaging but will defer or now  Orders:  -     HEPATITIS C AB; Future  -     METABOLIC PANEL, COMPREHENSIVE; Future  -     CBC W/O DIFF; Future  -     LIPID PANEL; Future  2. Anxiety  Assessment & Plan:  Uncontrolled, not sure if this is driving or being driven by her anxiety. Reviewed treatment options, will try Buspar 1/2 tab x 3 days, if SE's continue she will stop and let me know. Reviewed expectations & rational for treatment. She is hesitant to take Rx meds but has tried multiple OTC meds. 3. Mild episode of recurrent major depressive disorder ([facility] [address]  Assessment & Plan:  Uncontrolled, not sure if this is driving or being driven by her anxiety and sleep. Reviewed treatment options. Will defer SSRI and retry Buspar since it will work faster, assuming she tolerates it. Reviewed expectations & rational for treatment. She is hesitant to take Rx meds but has tried multiple OTC meds. 4. Psychophysiological insomnia  Assessment & Plan:  New dx as for 2 month ago, no obvious triggers, not sure if this is driving her anxiety or if this is driving anxiety. We reviewed tx options. Encouraged to try 1.5 tabs of Ambien. Continue to work on sleep hygiene. Do not lay in bed if she can't sleep. Can add in melatonin. Follow-up and Dispositions    · Return in about 6 weeks (around 12/31/2021) for Regular follow up. Subjective: Francisco Lamar is here today for a full physical.      Since last visit: having issues w/ sleep.  See ROS    Health Maintenance  Immunizations:   Covid: up to date  Influenza: up to date   Tetanus: up to date    Cancer screening:   Cervical: reviewed guidelines, up to date  Breast: reviewed guidelines, up to date. The following sections were reviewed & updated as appropriate: Problem List, Allergies, PMH, PSH, FH, and SH. Prior to Admission medications    Medication Sig Start Date End Date Taking? Authorizing Provider   busPIRone (BUSPAR) 7.5 mg tablet Take 1 Tablet by mouth two (2) times a day. 9/30/21  Yes Karen Toussaint MD   zolpidem (AMBIEN) 5 mg tablet Take 0.5 Tablets by mouth nightly as needed for Sleep. Max Daily Amount: 2.5 mg. 9/27/21  Yes Bossman Beaulieu MD   multivitamin (ONE A DAY) tablet Take 1 Tab by mouth daily. Yes Provider, Historical   fluticasone propionate (FLONASE) 50 mcg/actuation nasal spray 2 Sprays by Both Nostrils route daily. 4/30/19 11/19/21  Karen Toussaint MD          Review of Systems   Constitutional: Positive for weight loss (on purpose over the last 2 years). Respiratory: Negative for cough and shortness of breath. Cardiovascular: Positive for palpitations. Negative for chest pain. Gastrointestinal: Negative for abdominal pain, constipation, diarrhea, heartburn (did have some previously but resolved), nausea and vomiting. Musculoskeletal: Negative for joint pain and myalgias. Skin: Negative for itching and rash. Neurological: Negative for tremors and headaches. Endo/Heme/Allergies: Does not bruise/bleed easily. Psychiatric/Behavioral: Negative for depression. The patient is nervous/anxious and has insomnia. Saw Dr Shubham Garcia in Sept for insomnia. She attributes to taking mobic due to wrist pain. She was either working out later (8:30pm) and at times waking up early (4:30am). She reports there was a stretch of 3 days w/o sleep. Since then has had 2-3 hrs of sleep/night. She has tried multiple medications, including Ambien, w/o obvious help. Only other change was menses became irregular. Saw [facility], [address] showed an ovarian cyst.  Hormone levels (TSH, LH, E) were reviewed today. She reports normally able to manage her depression/anxiety until she started having trouble sleeping. Objective:   Physical Exam  Constitutional:       General: She is not in acute distress. Appearance: She is well-developed. HENT:      Right Ear: Tympanic membrane and ear canal normal.      Left Ear: Tympanic membrane and ear canal normal.   Eyes:      Conjunctiva/sclera: Conjunctivae normal.   Cardiovascular:      Rate and Rhythm: Regular rhythm. Heart sounds: Normal heart sounds. No murmur heard. Pulmonary:      Effort: Pulmonary effort is normal.      Breath sounds: Normal breath sounds. Abdominal:      General: Bowel sounds are normal.      Palpations: Abdomen is soft. Tenderness: There is no abdominal tenderness. Musculoskeletal:      Right lower leg: No edema. Left lower leg: No edema. Lymphadenopathy:      Cervical: No cervical adenopathy.    Psychiatric:         Mood and Affect: Mood and affect normal.            Visit Vitals  /81   Pulse 89   Temp 98.4 °F (36.9 °C) (Temporal)   Resp 16   Ht 5' 8\" (1.727 m)   Wt 142 lb (64.4 kg)   LMP 11/05/2021   SpO2 98%   BMI 21.59 kg/m²          Ofelia Casas MD

## 2021-11-19 NOTE — PATIENT INSTRUCTIONS
Well Visit, Ages 25 to 48: Care Instructions  Overview     Well visits can help you stay healthy. Your doctor has checked your overall health and may have suggested ways to take good care of yourself. Your doctor also may have recommended tests. At home, you can help prevent illness with healthy eating, regular exercise, and other steps. Follow-up care is a key part of your treatment and safety. Be sure to make and go to all appointments, and call your doctor if you are having problems. It's also a good idea to know your test results and keep a list of the medicines you take. How can you care for yourself at home? · Get screening tests that you and your doctor decide on. Screening helps find diseases before any symptoms appear. · Eat healthy foods. Choose fruits, vegetables, whole grains, protein, and low-fat dairy foods. Limit fat, especially saturated fat. Reduce salt in your diet. · Limit alcohol. If you are a man, have no more than 2 drinks a day or 14 drinks a week. If you are a woman, have no more than 1 drink a day or 7 drinks a week. · Get at least 30 minutes of physical activity on most days of the week. Walking is a good choice. You also may want to do other activities, such as running, swimming, cycling, or playing tennis or team sports. Discuss any changes in your exercise program with your doctor. · Reach and stay at a healthy weight. This will lower your risk for many problems, such as obesity, diabetes, heart disease, and high blood pressure. · Do not smoke or allow others to smoke around you. If you need help quitting, talk to your doctor about stop-smoking programs and medicines. These can increase your chances of quitting for good. · Care for your mental health. It is easy to get weighed down by worry and stress. Learn strategies to manage stress, like deep breathing and mindfulness, and stay connected with your family and community.  If you find you often feel sad or hopeless, talk with your doctor. Treatment can help. · Talk to your doctor about whether you have any risk factors for sexually transmitted infections (STIs). You can help prevent STIs if you wait to have sex with a new partner (or partners) until you've each been tested for STIs. It also helps if you use condoms (male or female condoms) and if you limit your sex partners to one person who only has sex with you. Vaccines are available for some STIs, such as HPV. · Use birth control if it's important to you to prevent pregnancy. Talk with your doctor about the choices available and what might be best for you. · If you think you may have a problem with alcohol or drug use, talk to your doctor. This includes prescription medicines (such as amphetamines and opioids) and illegal drugs (such as cocaine and methamphetamine). Your doctor can help you figure out what type of treatment is best for you. · Protect your skin from too much sun. When you're outdoors from 10 a.m. to 4 p.m., stay in the shade or cover up with clothing and a hat with a wide brim. Wear sunglasses that block UV rays. Even when it's cloudy, put broad-spectrum sunscreen (SPF 30 or higher) on any exposed skin. · See a dentist one or two times a year for checkups and to have your teeth cleaned. · Wear a seat belt in the car. When should you call for help? Watch closely for changes in your health, and be sure to contact your doctor if you have any problems or symptoms that concern you. Where can you learn more? Go to http://www.[employer].com/  Enter P072 in the search box to learn more about \"Well Visit, Ages 25 to 48: Care Instructions. \"  Current as of: February 11, 2021               Content Version: 13.0  © 4417-2323 Healthwise, Incorporated. Care instructions adapted under license by [employer] (which disclaims liability or warranty for this information).  If you have questions about a medical condition or this instruction, always ask your healthcare professional. [place] [address] any warranty or liability for your use of this information. Learning About Mindfulness for Stress  What are mindfulness and stress? Stress is what you feel when you have to handle more than you are used to. A lot of things can cause stress. You may feel stress when you go on a job interview, take a test, or run a race. This kind of short-term stress is normal and even useful. It can help you if you need to work hard or react quickly. Stress also can last a long time. Long-term stress is caused by stressful situations or events. Examples of long-term stress include long-term health problems, ongoing problems at work, and conflicts in your family. Long-term stress can harm your health. Mindfulness is a focus only on things happening in the present moment. It's a process of purposefully paying attention to and being aware of your surroundings, your emotions, your thoughts, and how your body feels. You are aware of these things, but you aren't judging these experiences as \"good\" or \"bad. \" Mindfulness can help you learn to calm your mind and body to help you cope with illness, pain, and stress. How does mindfulness help to relieve stress? Mindfulness can help quiet your mind and relax your body. Studies show that it can help some people sleep better, feel less anxious, and bring their blood pressure down. And it's been shown to help some people live and cope better with certain health problems like heart disease, depression, chronic pain, and cancer. How do you practice mindfulness? To be mindful is to pay attention, to be present, and to be accepting. · When you're mindful, you do just one thing and you pay close attention to that one thing. For example, you may sit quietly and notice your emotions or how your food tastes and smells. · When you're present, you focus on the things that are happening right now.  You let go of your thoughts about the past and the future. When you dwell on the past or the future, you miss moments that can heal and strengthen you. You may miss moments like hearing a child laugh or seeing a friendly face when you think you're all alone. · When you're accepting, you don't  the present moment. Instead you accept your thoughts and feelings as they come. You can practice anytime, anywhere, and in any way you choose. You can practice in many ways. Here are a few ideas:  · While doing your chores, like washing the dishes, let your mind focus on what's in your hand. What does the dish feel like? Is the water warm or cold? · Go outside and take a few deep breaths. What is the air like? Is it warm or cold? · When you can, take some time at the start of your day to sit alone and think. · Take a slow walk by yourself. Count your steps while you breathe in and out. · Try yoga breathing exercises, stretches, and poses to strengthen and relax your muscles. · At work, if you can, try to stop for a few moments each hour. Note how your body feels. Let yourself regroup and let your mind settle before you return to what you were doing. · If you struggle with anxiety or \"worry thoughts,\" imagine your mind as a blue larisa and your worry thoughts as clouds. Now imagine those worry thoughts floating across your mind's larisa. Just let them pass by as you watch. Follow-up care is a key part of your treatment and safety. Be sure to make and go to all appointments, and call your doctor if you are having problems. It's also a good idea to know your test results and keep a list of the medicines you take. Where can you learn more? Go to http://www.gray.com/  Enter M676 in the search box to learn more about \"Learning About Mindfulness for Stress. \"  Current as of: June 16, 2021               Content Version: 13.0  © 6861-4423 Healthwise, Incorporated.    Care instructions adapted under license by [employer] (which disclaims liability or warranty for this information). If you have questions about a medical condition or this instruction, always ask your healthcare professional. [address] any warranty or liability for your use of this information.

## 2021-11-20 ENCOUNTER — HOSPITAL ENCOUNTER (OUTPATIENT)
Dept: SLEEP MEDICINE | Age: 41
Discharge: HOME | End: 2021-11-20
Payer: COMMERCIAL

## 2021-11-20 LAB
ALBUMIN SERPL-MCNC: 4.8 G/DL (ref 3.8–4.8)
ALBUMIN/GLOB SERPL: 1.8 {RATIO} (ref 1.2–2.2)
ALP SERPL-CCNC: 33 IU/L (ref 44–121)
ALT SERPL-CCNC: 20 IU/L (ref 0–32)
AST SERPL-CCNC: 19 IU/L (ref 0–40)
BILIRUB SERPL-MCNC: 0.4 MG/DL (ref 0–1.2)
BUN SERPL-MCNC: 11 MG/DL (ref 6–24)
BUN/CREAT SERPL: 14 (ref 9–23)
CALCIUM SERPL-MCNC: 9.4 MG/DL (ref 8.7–10.2)
CHLORIDE SERPL-SCNC: 104 MMOL/L (ref 96–106)
CHOLEST SERPL-MCNC: 189 MG/DL (ref 100–199)
CO2 SERPL-SCNC: 25 MMOL/L (ref 20–29)
CREAT SERPL-MCNC: 0.81 MG/DL (ref 0.57–1)
ERYTHROCYTE [DISTWIDTH] IN BLOOD BY AUTOMATED COUNT: 12.2 % (ref 11.7–15.4)
GLOBULIN SER CALC-MCNC: 2.7 G/DL (ref 1.5–4.5)
GLUCOSE SERPL-MCNC: 84 MG/DL (ref 65–99)
HCT VFR BLD AUTO: 39.1 % (ref 34–46.6)
HCV AB S/CO SERPL IA: <0.1 S/CO RATIO (ref 0–0.9)
HDLC SERPL-MCNC: 71 MG/DL
HGB BLD-MCNC: 13.5 G/DL (ref 11.1–15.9)
LDLC SERPL CALC-MCNC: 111 MG/DL (ref 0–99)
MCH RBC QN AUTO: 33.7 PG (ref 26.6–33)
MCHC RBC AUTO-ENTMCNC: 34.5 G/DL (ref 31.5–35.7)
MCV RBC AUTO: 98 FL (ref 79–97)
PLATELET # BLD AUTO: 213 X10E3/UL (ref 150–450)
POTASSIUM SERPL-SCNC: 4.9 MMOL/L (ref 3.5–5.2)
PROT SERPL-MCNC: 7.5 G/DL (ref 6–8.5)
RBC # BLD AUTO: 4.01 X10E6/UL (ref 3.77–5.28)
SODIUM SERPL-SCNC: 141 MMOL/L (ref 134–144)
TRIGL SERPL-MCNC: 37 MG/DL (ref 0–149)
VLDLC SERPL CALC-MCNC: 7 MG/DL (ref 5–40)
WBC # BLD AUTO: 5 X10E3/UL (ref 3.4–10.8)

## 2021-11-20 PROCEDURE — 95806 SLEEP STUDY UNATT&RESP EFFT: CPT | Performed by: INTERNAL MEDICINE

## 2021-11-22 DIAGNOSIS — F51.01 PRIMARY INSOMNIA: ICD-10-CM

## 2021-11-22 RX ORDER — ZOLPIDEM TARTRATE 5 MG/1
5 TABLET ORAL
Qty: 15 TABLET | Refills: 0 | Status: SHIPPED
Start: 2021-11-22 | End: 2022-04-01

## 2021-11-22 NOTE — PROGRESS NOTES
Results released to patient via [employer]. All labs are stable or at goal for her.   The 10-year ASCVD risk score (Amira Joseph., et al., 2013) is: 0.3%

## 2021-11-23 NOTE — TELEPHONE ENCOUNTER
Chart & VA  reviewed.       Last visit with me:  11/19/2021   Last refilled on: 9/27/21 - #15    Future Appointments   Date Time Provider Darshan Yung   12/1/2021  8:30 PM [facility] 4 [address],  [address] SLEEP LAB [place]   12/2/2021  7:00 AM [facility] 2 [address],  [address] SLEEP LAB [place]   1/7/2022  8:15 AM Sean Chinchilla MD [facility] BS AMB        Last PDMP Yarely Vasquez as Reviewed:  Review User Review Instant Review Result   HUEY EDDY 11/22/2021  9:11 PM Reviewed PDMP [1]

## 2021-11-29 ENCOUNTER — TELEPHONE (OUTPATIENT)
Dept: SLEEP MEDICINE | Age: 41
End: 2021-11-29

## 2021-11-30 ENCOUNTER — VIRTUAL VISIT (OUTPATIENT)
Dept: SLEEP MEDICINE | Age: 41
End: 2021-11-30

## 2021-11-30 ENCOUNTER — TELEPHONE (OUTPATIENT)
Dept: SLEEP MEDICINE | Age: 41
End: 2021-11-30

## 2021-11-30 DIAGNOSIS — Z86.59 H/O MAJOR DEPRESSION: Primary | ICD-10-CM

## 2021-11-30 NOTE — TELEPHONE ENCOUNTER
Sleep test interpreted, office / virtual visit to be scheduled to review results and discuss treatment options.

## 2021-11-30 NOTE — TELEPHONE ENCOUNTER
Please review  the denied case below for  Nannette Mccartney ( 1980:    Crispin Kaur DENIED  CPT(S): 57557  INSURANCE: Sessions  SOURCE: Phone  SOURCE DETAILS: Alva Glynn. @ [facility] ([place])  CASE/TRACKING #: [id number]  DENIAL REASON: Additional clinical information is needed. CALL REF #: Not available  P2P phone#: [phone]  P2P expiration date: 2021   MDO CONTACT INFO: Emailing ordering physician's office  SPOKE TO: Email  MDO CONTACT DATE: 2021   MDO CONTACT TIME: 11:11 a.m. Electronically signed by Pavithra Mckeon on 21 at 11:07 AM    I spoke with representative, Alva Glynn. She stated they have openings for a P2P today. Patient scheduled for a Results apt today with Dr. Karon Walls. Sent Dr. Karon Walls a perfect serve to notify him and request next steps. Sleep study is tomorrow.

## 2021-11-30 NOTE — PROGRESS NOTES
Spoke with patient she indicated that she is sleeping better and working with her PCP on management of her anxiety. She denies of being as sleepy as she was at the time of her initial visit.  She indicated she would like to cancel the night and day test.    Elizabeth Sleepiness Score: (P) 2 (previously 22)    Modified F.O.S.Q. Score Total / 2: (P) 17.5

## 2021-11-30 NOTE — TELEPHONE ENCOUNTER
To be safe, I scheduled p2p for 430 pm today as that's what they had available, should Dr. Zuleika Tobar wish to move forward with the p2p after his visit with patient.

## 2021-12-01 ENCOUNTER — DOCUMENTATION ONLY (OUTPATIENT)
Dept: SLEEP MEDICINE | Age: 41
End: 2021-12-01

## 2022-01-03 RX ORDER — BUSPIRONE HYDROCHLORIDE 7.5 MG/1
TABLET ORAL
Qty: 60 TABLET | Refills: 0 | Status: SHIPPED | OUTPATIENT
Start: 2022-01-03 | End: 2022-04-01

## 2022-01-18 RX ORDER — ONDANSETRON 4 MG/1
4 TABLET, ORALLY DISINTEGRATING ORAL
Qty: 15 TABLET | Refills: 1 | Status: SHIPPED | OUTPATIENT
Start: 2022-01-18

## 2022-02-15 ENCOUNTER — TELEPHONE (OUTPATIENT)
Dept: INTERNAL MEDICINE CLINIC | Age: 42
End: 2022-02-15

## 2022-02-15 NOTE — TELEPHONE ENCOUNTER
----- Message from Mady Corrigan sent at 2/15/2022 10:32 AM EST -----  Subject: Appointment Request    Reason for Call: Routine Existing Condition Follow Up    QUESTIONS  Type of Appointment? Established Patient  Reason for appointment request? No appointments available during search  Additional Information for Provider? Patient is looking to get a f/u appt   sooner than march please cb to schedule this . Dr. Jarod Toribio wants to see her   for what they have been dealing with.   ---------------------------------------------------------------------------  --------------  [address]  What is the best way for the office to contact you? OK to leave message on   voicemail  Preferred Call Back Phone Number? [phone]  ---------------------------------------------------------------------------  --------------  SCRIPT ANSWERS  Relationship to Patient? Self  Is this follow up request related to routine Diabetes Management? No  Have you been diagnosed with, awaiting test results for, or told that you   are suspected of having COVID-19 (Coronavirus)? (If patient has tested   negative or was tested as a requirement for work, school, or travel and   not based on symptoms, answer no)? No  Within the past 10 days have you developed any of the following symptoms   (answer no if symptoms have been present longer than 10 days or began   more than 10 days ago)? Fever or Chills, Cough, Shortness of breath or   difficulty breathing, Loss of taste or smell, Sore throat, Nasal   congestion, Sneezing or runny nose, Fatigue or generalized body aches   (answer no if pain is specific to a body part e.g. back pain), Diarrhea,   Headache? No  Have you had close contact with someone with COVID-19 in the last 7 days? No  (Service Expert  click yes below to proceed with [employer] As Usual   Scheduling)?  Yes

## 2022-02-15 NOTE — TELEPHONE ENCOUNTER
She was supposed to f/u in Dec.  She was told to schedule a f/u after her visit in Nov.  She did not. She can have next available 15 min appointment, can't use SDF1 as this not an emergency.

## 2022-03-18 PROBLEM — F51.04 PSYCHOPHYSIOLOGICAL INSOMNIA: Status: ACTIVE | Noted: 2021-11-19

## 2022-03-18 PROBLEM — F41.9 ANXIETY: Status: ACTIVE | Noted: 2018-06-11

## 2022-03-31 NOTE — PROGRESS NOTES
E-Visit Note:    HPI: per patient questionnaire, this has been reviewed. She completed insomnia E-Visit, not mood E-Visit. EXAM: n/a     reviewed:   2/11/22 - medical THC   12/27/21 - valium 5mg - #6   12/24/21 - valium 5mg - #14   12/22/21 - Real Pen 5mg - #15 (Marquise Elam)   11/23/21 - ambien 5mg - #15 (me)      ----------------------------------  Diagnoses and all orders for this visit:    1. Psychophysiological insomnia       PLAN:   Unclear. I need more information. See questions via message to her.    ----------------------------------  The patient was advised to call if these symptoms worsen or fail to improve as anticipated. 11-20 minutes were spent on the digital evaluation and management of this patient. This also includes the anticipated MyChart conversation.       Tania Valera MD

## 2022-04-01 RX ORDER — BUSPIRONE HYDROCHLORIDE 5 MG/1
TABLET ORAL
COMMUNITY
Start: 2022-04-01

## 2022-11-28 ENCOUNTER — OFFICE VISIT (OUTPATIENT)
Dept: INTERNAL MEDICINE CLINIC | Age: 42
End: 2022-11-28
Payer: COMMERCIAL

## 2022-11-28 VITALS
DIASTOLIC BLOOD PRESSURE: 75 MMHG | OXYGEN SATURATION: 98 % | RESPIRATION RATE: 18 BRPM | HEIGHT: 68 IN | BODY MASS INDEX: 21.82 KG/M2 | SYSTOLIC BLOOD PRESSURE: 110 MMHG | WEIGHT: 144 LBS | HEART RATE: 70 BPM | TEMPERATURE: 98.2 F

## 2022-11-28 DIAGNOSIS — H04.123 DRY EYES: ICD-10-CM

## 2022-11-28 DIAGNOSIS — F41.9 ANXIETY: ICD-10-CM

## 2022-11-28 DIAGNOSIS — Z00.00 ROUTINE PHYSICAL EXAMINATION: Primary | ICD-10-CM

## 2022-11-28 PROBLEM — F51.04 PSYCHOPHYSIOLOGICAL INSOMNIA: Status: RESOLVED | Noted: 2021-11-19 | Resolved: 2022-11-28

## 2022-11-28 PROCEDURE — 99396 PREV VISIT EST AGE 40-64: CPT | Performed by: INTERNAL MEDICINE

## 2022-11-28 RX ORDER — BUSPIRONE HYDROCHLORIDE 10 MG/1
10 TABLET ORAL 2 TIMES DAILY
COMMUNITY

## 2022-11-28 RX ORDER — HYDROXYZINE 50 MG/1
TABLET, FILM COATED ORAL
COMMUNITY
Start: 2022-11-15 | End: 2022-11-28

## 2022-11-28 NOTE — PROGRESS NOTES
Bisi Priest is a 40 y.o. female who was seen in clinic today (11/28/2022) for a full physical.   Patient was seen with Dr Otto Lujan (R[facility] at [facility]). Assessment & Plan:   Below is the assessment and plan developed based on review of pertinent history, physical exam, labs, studies, and medications. 1. Routine physical examination  -     METABOLIC PANEL, COMPREHENSIVE; Future  -     CBC W/O DIFF; Future  -     LIPID PANEL; Future  2. Anxiety  Assessment & Plan:  Improved, continue with current specialist, continue current medications   3. Dry eyes  Comments:  new dx, intermittent, differential reviewed, will start w/ OTC allergy eye drops or saline drops      Follow-up and Dispositions    Return in about 1 year (around 11/28/2023) for FULL PHYSICAL. Subjective: Eduardo Sazn is here today for a full physical.      Since last visit: had L elbow surgery    Health Maintenance  Immunizations:   Covid: up to date ([facility])   Influenza: up to date   Tetanus: up to date    Cancer screening:   Cervical: reviewed guidelines, up to date  Breast: reviewed guidelines, up to date. The following sections were reviewed & updated as appropriate: Problem List, Allergies, PMH, PSH, FH, and SH. Prior to Admission medications    Medication Sig Start Date End Date Taking? Authorizing Provider   busPIRone (BUSPAR) 10 mg tablet Take 10 mg by mouth two (2) times a day. Yes Provider, Historical   multivitamin (ONE A DAY) tablet Take 1 Tab by mouth daily. Yes Provider, Historical   hydrOXYzine HCL (ATARAX) 50 mg tablet  11/15/22 11/28/22  Provider, Historical   busPIRone (BUSPAR) 5 mg tablet 2 tabs in the morning and 3 tabs at night. 4/1/22 11/28/22  Ama Rivas MD   ondansetron (ZOFRAN ODT) 4 mg disintegrating tablet Take 1 Tablet by mouth every eight (8) hours as needed for Nausea or Nausea or Vomiting.  1/18/22 11/28/22  Ama Rivas MD          Review of Systems   Constitutional:  Negative for chills and fever. HENT:          Dry eyes bilateral, once/wk, feels like there is grit, no contacts or glasses, no discoloration, no pain   Respiratory:  Negative for cough and shortness of breath. Cardiovascular:  Negative for chest pain and palpitations. Gastrointestinal:  Negative for abdominal pain, blood in stool, constipation, diarrhea, heartburn, nausea and vomiting. Musculoskeletal:  Positive for joint pain (L elbow from surgery, is improving). Negative for myalgias. Neurological:  Negative for tingling, focal weakness and headaches. Endo/Heme/Allergies:  Does not bruise/bleed easily. Psychiatric/Behavioral:  Negative for depression. The patient is not nervous/anxious and does not have insomnia. Objective:   Physical Exam  Constitutional:       General: She is not in acute distress. Appearance: She is well-developed. HENT:      Right Ear: Tympanic membrane and ear canal normal.      Left Ear: Tympanic membrane and ear canal normal.   Eyes:      Conjunctiva/sclera: Conjunctivae normal.   Cardiovascular:      Rate and Rhythm: Regular rhythm. Heart sounds: Normal heart sounds. No murmur heard. Pulmonary:      Effort: Pulmonary effort is normal.      Breath sounds: Normal breath sounds. Abdominal:      General: Bowel sounds are normal.      Palpations: Abdomen is soft. There is no hepatomegaly or splenomegaly. Tenderness: There is no abdominal tenderness. Musculoskeletal:      Right lower leg: No edema. Left lower leg: No edema. Lymphadenopathy:      Cervical: No cervical adenopathy.    Psychiatric:         Mood and Affect: Mood and affect normal.        Visit Vitals  /75   Pulse 70   Temp 98.2 °F (36.8 °C) (Temporal)   Resp 18   Ht 5' 8\" (1.727 m)   Wt 144 lb (65.3 kg)   LMP 11/23/2022   SpO2 98%   BMI 21.90 kg/m²          Sumaya Goddard MD

## 2022-11-29 LAB
ALBUMIN SERPL-MCNC: 4.7 G/DL (ref 3.8–4.8)
ALBUMIN/GLOB SERPL: 2.1 {RATIO} (ref 1.2–2.2)
ALP SERPL-CCNC: 33 IU/L (ref 44–121)
ALT SERPL-CCNC: 11 IU/L (ref 0–32)
AST SERPL-CCNC: 17 IU/L (ref 0–40)
BILIRUB SERPL-MCNC: 0.5 MG/DL (ref 0–1.2)
BUN SERPL-MCNC: 13 MG/DL (ref 6–24)
BUN/CREAT SERPL: 15 (ref 9–23)
CALCIUM SERPL-MCNC: 9 MG/DL (ref 8.7–10.2)
CHLORIDE SERPL-SCNC: 102 MMOL/L (ref 96–106)
CHOLEST SERPL-MCNC: 186 MG/DL (ref 100–199)
CO2 SERPL-SCNC: 24 MMOL/L (ref 20–29)
CREAT SERPL-MCNC: 0.86 MG/DL (ref 0.57–1)
EGFR: 87 ML/MIN/1.73
ERYTHROCYTE [DISTWIDTH] IN BLOOD BY AUTOMATED COUNT: 11.8 % (ref 11.7–15.4)
GLOBULIN SER CALC-MCNC: 2.2 G/DL (ref 1.5–4.5)
GLUCOSE SERPL-MCNC: 81 MG/DL (ref 70–99)
HCT VFR BLD AUTO: 34.3 % (ref 34–46.6)
HDLC SERPL-MCNC: 66 MG/DL
HGB BLD-MCNC: 12.1 G/DL (ref 11.1–15.9)
LDLC SERPL CALC-MCNC: 110 MG/DL (ref 0–99)
MCH RBC QN AUTO: 32.9 PG (ref 26.6–33)
MCHC RBC AUTO-ENTMCNC: 35.3 G/DL (ref 31.5–35.7)
MCV RBC AUTO: 93 FL (ref 79–97)
PLATELET # BLD AUTO: 177 X10E3/UL (ref 150–450)
POTASSIUM SERPL-SCNC: 4.3 MMOL/L (ref 3.5–5.2)
PROT SERPL-MCNC: 6.9 G/DL (ref 6–8.5)
RBC # BLD AUTO: 3.68 X10E6/UL (ref 3.77–5.28)
SODIUM SERPL-SCNC: 141 MMOL/L (ref 134–144)
TRIGL SERPL-MCNC: 51 MG/DL (ref 0–149)
VLDLC SERPL CALC-MCNC: 10 MG/DL (ref 5–40)
WBC # BLD AUTO: 3.6 X10E3/UL (ref 3.4–10.8)

## 2022-11-29 NOTE — PROGRESS NOTES
Results released to patient via [employer]. All labs are stable or at goal for her.   The 10-year ASCVD risk score (Diane DAVIS, et al., 2019) is: 0.3%

## 2022-12-10 NOTE — TELEPHONE ENCOUNTER
Called pt and left a message to call back for 2nd time.
Called pt and left a message to call back.
Pt would like a CPE squeezed in by the end of the year.  She had a baby recently and that is why she has not made a appt
This has been sitting in the box for 6 days. Okay to schedule 15 minute f/u. Will do CPE in that time.
12

## 2023-03-10 ENCOUNTER — OFFICE VISIT (OUTPATIENT)
Dept: PRIMARY CARE CLINIC | Age: 43
End: 2023-03-10

## 2023-03-10 VITALS
RESPIRATION RATE: 18 BRPM | HEART RATE: 94 BPM | SYSTOLIC BLOOD PRESSURE: 126 MMHG | BODY MASS INDEX: 22.25 KG/M2 | DIASTOLIC BLOOD PRESSURE: 87 MMHG | TEMPERATURE: 98.5 F | OXYGEN SATURATION: 98 % | HEIGHT: 68 IN | WEIGHT: 146.8 LBS

## 2023-03-10 DIAGNOSIS — M25.552 ACUTE HIP PAIN, LEFT: Primary | ICD-10-CM

## 2023-03-10 RX ORDER — HYDROXYZINE 50 MG/1
TABLET, FILM COATED ORAL
COMMUNITY
Start: 2022-12-24

## 2023-03-10 NOTE — PROGRESS NOTES
Chief Complaint   Patient presents with    Hip Injury     Snowtubing. Left side. About 3 weeks ago. Some bruising but has cleared up. HISTORY OF PRESENT ILLNESS  Nannette Brooks is a 40 y.o. female. Onset 3-4 weeks ago after injuring left hip while tubing on ice. She reports she  can't sit well and better to stand. Associated back pain. No previous left hip injuries. Currently being seen for right hip pain at [address]. She has appointment with them on 3/15 for follow up. She is taking Diclofenac once a day, occasional ibuprofen. She would like xray today. Review of Systems   Constitutional: Negative. Musculoskeletal:  Positive for joint pain. Physical Exam  Vitals and nursing note reviewed. Cardiovascular:      Rate and Rhythm: Normal rate. Pulmonary:      Effort: Pulmonary effort is normal.   Musculoskeletal:      Cervical back: Neck supple. Left hip: Tenderness present. No deformity or bony tenderness. Normal range of motion. Legs:       Comments: Difficulty localizing pain, mildly tender over left hip bursa and mildly tender with palpation to mid gluteus. Full ROM   Neurological:      Mental Status: She is alert. Past Medical History:   Diagnosis Date    Anemia 2000    Contact dermatitis and eczema due to cause 2004    Depression 10/06/2014     Past Surgical History:   Procedure Laterality Date    HX COLONOSCOPY N/A 02/12/2010    normal; bx colon-lymphoid aggregates, bx ileum- no diagnostic features    HX ORTHOPAEDIC Right 1998    wrist arthroscopy    HX ORTHOPAEDIC Left 10/08/2022    lateral epicondylitis surgery     /87 (BP 1 Location: Left arm, BP Patient Position: Standing, BP Cuff Size: Adult)   Pulse 94   Temp 98.5 °F (36.9 °C) (Temporal)   Resp 18   Ht 5' 8\" (1.727 m)   Wt 146 lb 12.8 oz (66.6 kg)   SpO2 98%   BMI 22.32 kg/m²    ASSESSMENT and PLAN  1. Acute hip pain, left  -     XR HIP LT W OR WO PELV 2-3 VWS; Future  PACS images reviewed with patient. Continue home medications for pain and follow up with Ortho on Wednesday   LIZA Phillips

## 2023-03-10 NOTE — PROGRESS NOTES
Identified pt with two pt identifiers(name and ). Reviewed record in preparation for visit and have obtained necessary documentation. Chief Complaint   Patient presents with    Hip Injury     Snowtubing. Left side. About 3 weeks ago. Some bruising but has cleared up. Vitals:    03/10/23 0909   BP: 126/87   Pulse: 94   Resp: 18   Temp: 98.5 °F (36.9 °C)   TempSrc: Temporal   SpO2: 98%   Weight: 146 lb 12.8 oz (66.6 kg)   Height: 5' 8\" (1.727 m)   PainSc:   6       There are no preventive care reminders to display for this patient. Coordination of Care Questionnaire:  :   1) Have you been to an emergency room, urgent care, or hospitalized since your last visit? If yes, where when, and reason for visit? no       2. Have seen or consulted any other health care provider since your last visit? If yes, where when, and reason for visit? NO      Patient is accompanied by self I have received verbal consent from Mio Keen to discuss any/all medical information while they are present in the room. An electronic signature was used to authenticate this note.   -- Rivera Lowery LPN

## 2023-04-13 ENCOUNTER — TELEPHONE (OUTPATIENT)
Dept: INTERNAL MEDICINE CLINIC | Age: 43
End: 2023-04-13

## 2023-04-19 ENCOUNTER — DOCUMENTATION ONLY (OUTPATIENT)
Dept: INTERNAL MEDICINE CLINIC | Age: 43
End: 2023-04-19

## 2023-04-19 NOTE — PROGRESS NOTES
Faxed referral/ order Connor [email or web address] . Received confirmation same day. Will have documents scanned into chart. . please advise Manuel Fuentes

## 2023-05-29 RX ORDER — HYDROXYZINE 50 MG/1
TABLET, FILM COATED ORAL
COMMUNITY
Start: 2022-12-24

## 2023-05-29 RX ORDER — BUSPIRONE HYDROCHLORIDE 10 MG/1
10 TABLET ORAL 2 TIMES DAILY
COMMUNITY

## 2023-05-30 SDOH — ECONOMIC STABILITY: FOOD INSECURITY: WITHIN THE PAST 12 MONTHS, THE FOOD YOU BOUGHT JUST DIDN'T LAST AND YOU DIDN'T HAVE MONEY TO GET MORE.: SOMETIMES TRUE

## 2023-05-30 SDOH — ECONOMIC STABILITY: INCOME INSECURITY: HOW HARD IS IT FOR YOU TO PAY FOR THE VERY BASICS LIKE FOOD, HOUSING, MEDICAL CARE, AND HEATING?: SOMEWHAT HARD

## 2023-05-30 SDOH — ECONOMIC STABILITY: TRANSPORTATION INSECURITY
IN THE PAST 12 MONTHS, HAS LACK OF TRANSPORTATION KEPT YOU FROM MEETINGS, WORK, OR FROM GETTING THINGS NEEDED FOR DAILY LIVING?: NO

## 2023-05-30 SDOH — ECONOMIC STABILITY: HOUSING INSECURITY
IN THE LAST 12 MONTHS, WAS THERE A TIME WHEN YOU DID NOT HAVE A STEADY PLACE TO SLEEP OR SLEPT IN A SHELTER (INCLUDING NOW)?: NO

## 2023-05-30 SDOH — ECONOMIC STABILITY: FOOD INSECURITY: WITHIN THE PAST 12 MONTHS, YOU WORRIED THAT YOUR FOOD WOULD RUN OUT BEFORE YOU GOT MONEY TO BUY MORE.: SOMETIMES TRUE

## 2023-05-31 ENCOUNTER — OFFICE VISIT (OUTPATIENT)
Age: 43
End: 2023-05-31
Payer: COMMERCIAL

## 2023-05-31 VITALS
TEMPERATURE: 97.8 F | HEART RATE: 92 BPM | BODY MASS INDEX: 23.04 KG/M2 | SYSTOLIC BLOOD PRESSURE: 109 MMHG | RESPIRATION RATE: 20 BRPM | WEIGHT: 152 LBS | DIASTOLIC BLOOD PRESSURE: 72 MMHG | HEIGHT: 68 IN | OXYGEN SATURATION: 98 %

## 2023-05-31 DIAGNOSIS — R30.0 DYSURIA: ICD-10-CM

## 2023-05-31 DIAGNOSIS — N39.43 URINARY DRIBBLING: ICD-10-CM

## 2023-05-31 DIAGNOSIS — R39.15 URINARY URGENCY: Primary | ICD-10-CM

## 2023-05-31 LAB
BILIRUBIN, URINE, POC: NEGATIVE
BLOOD URINE, POC: NEGATIVE
GLUCOSE URINE, POC: NEGATIVE
KETONES, URINE, POC: NEGATIVE
LEUKOCYTE ESTERASE, URINE, POC: NEGATIVE
NITRITE, URINE, POC: NEGATIVE
PH, URINE, POC: 6 (ref 4.6–8)
PROTEIN,URINE, POC: NEGATIVE
SPECIFIC GRAVITY, URINE, POC: 1.02 (ref 1–1.03)
URINALYSIS CLARITY, POC: CLEAR
URINALYSIS COLOR, POC: YELLOW
UROBILINOGEN, POC: NORMAL

## 2023-05-31 PROCEDURE — 99213 OFFICE O/P EST LOW 20 MIN: CPT | Performed by: NURSE PRACTITIONER

## 2023-05-31 PROCEDURE — 81001 URINALYSIS AUTO W/SCOPE: CPT | Performed by: NURSE PRACTITIONER

## 2023-05-31 RX ORDER — ASPIRIN 81 MG/1
81 TABLET, CHEWABLE ORAL DAILY
COMMUNITY

## 2023-05-31 ASSESSMENT — PATIENT HEALTH QUESTIONNAIRE - PHQ9
SUM OF ALL RESPONSES TO PHQ9 QUESTIONS 1 & 2: 0
2. FEELING DOWN, DEPRESSED OR HOPELESS: 0
SUM OF ALL RESPONSES TO PHQ QUESTIONS 1-9: 0
1. LITTLE INTEREST OR PLEASURE IN DOING THINGS: 0

## 2023-05-31 NOTE — PROGRESS NOTES
Chris Cobb (:  1980) is a 40 y.o. female,here for evaluation of the following chief complaint(s):  Urinary Tract Infection ([facility], surgery recently. Questioning if Meloxicam for recent surgery is culprit.)        SUBJECTIVE/OBJECTIVE:    HPI:Patient reports urinary urgency x 3 weeks since hip surgery. She tore labrum and had traction during surgery. No urinary catheter was placed. She had dysuria starting last week. She went to [facility] and was diagnosed with UTI. She has 1+ leukocytes and nitrites, but urine culture came back negative. She took macrobid x 5 days with no improvement. She notes ongoing dysuria, urgency, and some dribbling/mild incontinence. Patient is being followed by ortho for lumbar radiculitis. She has a planned MRI of lumbar spine. /72   Pulse 92   Temp 97.8 °F (36.6 °C) (Oral)   Resp 20   Ht 5' 8\" (1.727 m)   Wt 152 lb (68.9 kg)   SpO2 98%   BMI 23.11 kg/m²       Review of Systems   Genitourinary:  Positive for dysuria and urgency. Physical Exam  Constitutional:       Appearance: Normal appearance. Musculoskeletal:      Comments: Patient wearing brace to support hip   Skin:     General: Skin is warm and dry. Neurological:      General: No focal deficit present. Mental Status: She is alert and oriented to person, place, and time.    Psychiatric:         Mood and Affect: Mood normal.         Behavior: Behavior normal.      Results for orders placed or performed in visit on 23   AMB POC URINALYSIS DIP STICK AUTO W/ MICRO   Result Value Ref Range    Color (UA POC) Yellow     Clarity (UA POC) Clear     Glucose, Urine, POC Negative Negative    Bilirubin, Urine, POC Negative Negative    Ketones, Urine, POC Negative Negative    Specific Gravity, Urine, POC 1.020 1.001 - 1.035    Blood (UA POC) Negative Negative    pH, Urine, POC 6.0 4.6 - 8.0    Protein, Urine, POC Negative Negative    Urobilinogen, POC 0.2 mg/dL     Nitrite, Urine,

## 2023-06-02 LAB
BACTERIA SPEC CULT: NORMAL
SERVICE CMNT-IMP: NORMAL

## 2023-09-18 ENCOUNTER — OFFICE VISIT (OUTPATIENT)
Age: 43
End: 2023-09-18
Payer: COMMERCIAL

## 2023-09-18 VITALS
TEMPERATURE: 98.5 F | HEART RATE: 89 BPM | SYSTOLIC BLOOD PRESSURE: 123 MMHG | HEIGHT: 68 IN | BODY MASS INDEX: 23.01 KG/M2 | OXYGEN SATURATION: 99 % | RESPIRATION RATE: 16 BRPM | DIASTOLIC BLOOD PRESSURE: 82 MMHG | WEIGHT: 151.8 LBS

## 2023-09-18 DIAGNOSIS — F41.9 ANXIETY: ICD-10-CM

## 2023-09-18 DIAGNOSIS — M79.18 CHRONIC BUTTOCK PAIN: Primary | ICD-10-CM

## 2023-09-18 DIAGNOSIS — G89.29 CHRONIC BUTTOCK PAIN: Primary | ICD-10-CM

## 2023-09-18 PROCEDURE — 99214 OFFICE O/P EST MOD 30 MIN: CPT | Performed by: INTERNAL MEDICINE

## 2023-09-18 RX ORDER — TRAZODONE HYDROCHLORIDE 50 MG/1
25 TABLET ORAL NIGHTLY
COMMUNITY

## 2023-09-18 ASSESSMENT — PATIENT HEALTH QUESTIONNAIRE - PHQ9
SUM OF ALL RESPONSES TO PHQ QUESTIONS 1-9: 4
SUM OF ALL RESPONSES TO PHQ QUESTIONS 1-9: 2
1. LITTLE INTEREST OR PLEASURE IN DOING THINGS: 1
6. FEELING BAD ABOUT YOURSELF - OR THAT YOU ARE A FAILURE OR HAVE LET YOURSELF OR YOUR FAMILY DOWN: 0
SUM OF ALL RESPONSES TO PHQ9 QUESTIONS 1 & 2: 2
SUM OF ALL RESPONSES TO PHQ QUESTIONS 1-9: 2
SUM OF ALL RESPONSES TO PHQ QUESTIONS 1-9: 4
SUM OF ALL RESPONSES TO PHQ QUESTIONS 1-9: 2
1. LITTLE INTEREST OR PLEASURE IN DOING THINGS: 1
2. FEELING DOWN, DEPRESSED OR HOPELESS: 1
3. TROUBLE FALLING OR STAYING ASLEEP: 1
7. TROUBLE CONCENTRATING ON THINGS, SUCH AS READING THE NEWSPAPER OR WATCHING TELEVISION: 0
SUM OF ALL RESPONSES TO PHQ9 QUESTIONS 1 & 2: 2
4. FEELING TIRED OR HAVING LITTLE ENERGY: 1
8. MOVING OR SPEAKING SO SLOWLY THAT OTHER PEOPLE COULD HAVE NOTICED. OR THE OPPOSITE, BEING SO FIGETY OR RESTLESS THAT YOU HAVE BEEN MOVING AROUND A LOT MORE THAN USUAL: 0
SUM OF ALL RESPONSES TO PHQ QUESTIONS 1-9: 4
5. POOR APPETITE OR OVEREATING: 0
2. FEELING DOWN, DEPRESSED OR HOPELESS: 1
SUM OF ALL RESPONSES TO PHQ QUESTIONS 1-9: 2
9. THOUGHTS THAT YOU WOULD BE BETTER OFF DEAD, OR OF HURTING YOURSELF: 0
SUM OF ALL RESPONSES TO PHQ QUESTIONS 1-9: 4
10. IF YOU CHECKED OFF ANY PROBLEMS, HOW DIFFICULT HAVE THESE PROBLEMS MADE IT FOR YOU TO DO YOUR WORK, TAKE CARE OF THINGS AT HOME, OR GET ALONG WITH OTHER PEOPLE: 1

## 2023-09-18 NOTE — PROGRESS NOTES
resolved). It is still present but minimal.  Then when she moved to actually doing strengthening pain more then stretching. No pain during the work out but that night or the next day the pain would return. This was located on her \"sit bone\". It is a deep ache to burning. It would move around her buttock area. It never went down her leg. June 2023 went to [facility]. Found some cysts on ovarian and a fibroid. July 2023 her periods changed. Is occurring every 15 days. She reports very heavy. She feels flu like symptoms prior to her menses. She would also have episodes of incontinence. I had an MRI on low back and disc extrusion was found at L5/S1. But pudendal and sits bone pain deemed unrelated by Dr Domenica Bingham at [facility]. During this time I also started having pudendal nerve symptoms (deep ache and burning in vagina area on left side). I have been seeing a pelvic . Dr Cliff Mcdaniel PT. So far no improvement. Current she is having constant burning pain just in her \"left sits bone\" and intermittent groin nerve pain. The sits bone pain never radiates down my thigh/leg. I am unable sit/stand/lay down comfortably. Some days walking is too painful. She was given steroids but never tried them. She did try Tramadol and it did help the once time she needed it. Prior to Admission medications    Medication Sig Start Date End Date Taking?  Authorizing Provider   traZODone (DESYREL) 50 MG tablet Take 0.5 tablets by mouth nightly   Yes Historical Provider, MD   Multiple Vitamin (MULTIVITAMIN PO) Take by mouth daily   Yes Historical Provider, MD   COLLAGEN PO Take by mouth daily   Yes Historical Provider, MD   Omega-3 Fatty Acids (FISH OIL PO) Take by mouth daily   Yes Historical Provider, MD   busPIRone (BUSPAR) 10 MG tablet Take 2 tablets by mouth daily   Yes Ar Automatic Reconciliation   hydrOXYzine HCl (ATARAX) 50 MG tablet Take 0.5 tablets by mouth daily ceived

## 2023-09-19 NOTE — ASSESSMENT & PLAN NOTE
Control is acceptable under the circumstances. Will defer to specialist.  Did review consideration of trying Cymbalta to help w/ pain since she didn't tolerate Lexapro.   Did not recommend any specific changes at this time

## 2023-09-19 NOTE — ASSESSMENT & PLAN NOTE
New dx to me, differential reviewed, unclear etiology. Will review available imaging. At this time does need to consider L5/S1 reported disk bulge as a possible etiology. She never tried steroids so I recommended it. We also reviewed trial of Gabapentin or Lyrica to see if there is a neurological component. She has f/u with ortho today and will d/w him.   At this time will defer to specialist.

## 2023-12-05 ENCOUNTER — OFFICE VISIT (OUTPATIENT)
Age: 43
End: 2023-12-05
Payer: COMMERCIAL

## 2023-12-05 VITALS
SYSTOLIC BLOOD PRESSURE: 123 MMHG | WEIGHT: 146.2 LBS | BODY MASS INDEX: 22.16 KG/M2 | RESPIRATION RATE: 16 BRPM | DIASTOLIC BLOOD PRESSURE: 82 MMHG | TEMPERATURE: 97.3 F | OXYGEN SATURATION: 100 % | HEIGHT: 68 IN | HEART RATE: 68 BPM

## 2023-12-05 DIAGNOSIS — M79.18 CHRONIC BUTTOCK PAIN: ICD-10-CM

## 2023-12-05 DIAGNOSIS — Z00.00 ROUTINE PHYSICAL EXAMINATION: ICD-10-CM

## 2023-12-05 DIAGNOSIS — F41.9 ANXIETY: ICD-10-CM

## 2023-12-05 DIAGNOSIS — G89.29 CHRONIC BUTTOCK PAIN: ICD-10-CM

## 2023-12-05 DIAGNOSIS — Z00.00 ROUTINE PHYSICAL EXAMINATION: Primary | ICD-10-CM

## 2023-12-05 PROCEDURE — 99396 PREV VISIT EST AGE 40-64: CPT | Performed by: INTERNAL MEDICINE

## 2023-12-05 RX ORDER — ESCITALOPRAM OXALATE 5 MG
5 TABLET ORAL DAILY
COMMUNITY
Start: 2023-10-03

## 2023-12-05 ASSESSMENT — ENCOUNTER SYMPTOMS
NAUSEA: 0
ABDOMINAL PAIN: 0
COUGH: 0
BLOOD IN STOOL: 0
DIARRHEA: 0
SHORTNESS OF BREATH: 0
CONSTIPATION: 0
VOMITING: 0

## 2023-12-05 ASSESSMENT — PATIENT HEALTH QUESTIONNAIRE - PHQ9
SUM OF ALL RESPONSES TO PHQ QUESTIONS 1-9: 0
SUM OF ALL RESPONSES TO PHQ QUESTIONS 1-9: 0
SUM OF ALL RESPONSES TO PHQ9 QUESTIONS 1 & 2: 0
2. FEELING DOWN, DEPRESSED OR HOPELESS: 0
1. LITTLE INTEREST OR PLEASURE IN DOING THINGS: 0
SUM OF ALL RESPONSES TO PHQ QUESTIONS 1-9: 0
SUM OF ALL RESPONSES TO PHQ QUESTIONS 1-9: 0

## 2023-12-05 ASSESSMENT — LIFESTYLE VARIABLES
HOW MANY STANDARD DRINKS CONTAINING ALCOHOL DO YOU HAVE ON A TYPICAL DAY: 1 OR 2
HOW OFTEN DO YOU HAVE A DRINK CONTAINING ALCOHOL: 2-3 TIMES A WEEK

## 2023-12-05 NOTE — ASSESSMENT & PLAN NOTE
Chronic issue, pain is stable, she is still w/o a definitive diagnosis. She has seen several different physicians and physical therapists. Recommended to stick with one provider to work it up.   Sounds like it might be more hip related per her report

## 2023-12-05 NOTE — PROGRESS NOTES
Dada Dave is a 44 y.o. female who was seen in clinic today (12/5/2023) for a full physical.        Assessment & Plan:   Below is the assessment and plan developed based on review of pertinent history, physical exam, labs, studies, and medications. 1. Routine physical examination  Comments:  Previous labs reviewed, could not donate blood due to low HCT recently, will repeat labs, off MVI, no changes pending review of labs  Orders:  -     Comprehensive Metabolic Panel; Future  -     CBC; Future  -     Lipid Panel; Future  2. Chronic buttock pain  Assessment & Plan:  Chronic issue, pain is stable, she is still w/o a definitive diagnosis. She has seen several different physicians and physical therapists. Recommended to stick with one provider to work it up. Sounds like it might be more hip related per her report  3. Anxiety  Assessment & Plan:  Overall stable. Monitored by specialist- no acute findings meriting change in the plan     Return in about 1 year (around 12/5/2024) for FULL PHYSICAL. Subjective: Hua Porras is here today for a full physical.    Since last visit: weight is stable    Health Maintenance  Immunizations:   Covid: up to date  Influenza: up to date   Tetanus: up to date    Cancer screening:   Cervical: reviewed guidelines, up to date  Breast: reviewed guidelines, UTD, done by OBGYN, records requested. The following sections were reviewed & updated as appropriate: Problem List, Allergies, PMH, PSH, FH, and SH. Prior to Admission medications    Medication Sig Start Date End Date Taking?  Authorizing Provider   LEXAPRO 5 MG tablet Take 1 tablet by mouth daily 10/3/23  Yes ProviderJuan MD   traZODone (DESYREL) 50 MG tablet Take 0.5 tablets by mouth nightly   Yes ProviderJuan MD   COLLAGEN PO Take by mouth daily   Yes ProviderJuan MD   busPIRone (BUSPAR) 10 MG tablet Take 2 tablets by mouth daily   Yes Automatic Reconciliation, Ar   Multiple Vitamin

## 2023-12-06 LAB
ALBUMIN SERPL-MCNC: 4.6 G/DL (ref 3.9–4.9)
ALBUMIN/GLOB SERPL: 2.2 {RATIO} (ref 1.2–2.2)
ALP SERPL-CCNC: 27 IU/L (ref 44–121)
ALT SERPL-CCNC: 11 IU/L (ref 0–32)
AST SERPL-CCNC: 13 IU/L (ref 0–40)
BILIRUB SERPL-MCNC: 0.6 MG/DL (ref 0–1.2)
BUN SERPL-MCNC: 12 MG/DL (ref 6–24)
BUN/CREAT SERPL: 15 (ref 9–23)
CALCIUM SERPL-MCNC: 9.2 MG/DL (ref 8.7–10.2)
CHLORIDE SERPL-SCNC: 103 MMOL/L (ref 96–106)
CHOLEST SERPL-MCNC: 201 MG/DL (ref 100–199)
CO2 SERPL-SCNC: 25 MMOL/L (ref 20–29)
CREAT SERPL-MCNC: 0.79 MG/DL (ref 0.57–1)
EGFRCR SERPLBLD CKD-EPI 2021: 96 ML/MIN/1.73
ERYTHROCYTE [DISTWIDTH] IN BLOOD BY AUTOMATED COUNT: 12 % (ref 11.7–15.4)
GLOBULIN SER CALC-MCNC: 2.1 G/DL (ref 1.5–4.5)
GLUCOSE SERPL-MCNC: 86 MG/DL (ref 70–99)
HCT VFR BLD AUTO: 34.1 % (ref 34–46.6)
HDLC SERPL-MCNC: 72 MG/DL
HGB BLD-MCNC: 11.6 G/DL (ref 11.1–15.9)
LDLC SERPL CALC-MCNC: 121 MG/DL (ref 0–99)
MCH RBC QN AUTO: 31.9 PG (ref 26.6–33)
MCHC RBC AUTO-ENTMCNC: 34 G/DL (ref 31.5–35.7)
MCV RBC AUTO: 94 FL (ref 79–97)
PLATELET # BLD AUTO: 220 X10E3/UL (ref 150–450)
POTASSIUM SERPL-SCNC: 4.2 MMOL/L (ref 3.5–5.2)
PROT SERPL-MCNC: 6.7 G/DL (ref 6–8.5)
RBC # BLD AUTO: 3.64 X10E6/UL (ref 3.77–5.28)
SODIUM SERPL-SCNC: 142 MMOL/L (ref 134–144)
TRIGL SERPL-MCNC: 43 MG/DL (ref 0–149)
VLDLC SERPL CALC-MCNC: 8 MG/DL (ref 5–40)
WBC # BLD AUTO: 4.3 X10E3/UL (ref 3.4–10.8)

## 2023-12-06 NOTE — RESULT ENCOUNTER NOTE
Results released to patient via [email or web address].  All labs are stable or at goal for her. HGB down slightly but still wnl. Lipids stable.

## 2024-02-09 ENCOUNTER — OFFICE VISIT (OUTPATIENT)
Age: 44
End: 2024-02-09

## 2024-02-09 VITALS
DIASTOLIC BLOOD PRESSURE: 71 MMHG | HEIGHT: 68 IN | HEART RATE: 86 BPM | WEIGHT: 142.2 LBS | OXYGEN SATURATION: 100 % | RESPIRATION RATE: 16 BRPM | SYSTOLIC BLOOD PRESSURE: 111 MMHG | TEMPERATURE: 97.8 F | BODY MASS INDEX: 21.55 KG/M2

## 2024-02-09 DIAGNOSIS — R63.4 WEIGHT LOSS: Primary | ICD-10-CM

## 2024-02-09 DIAGNOSIS — R63.4 WEIGHT LOSS: ICD-10-CM

## 2024-02-09 RX ORDER — CYCLOBENZAPRINE HCL 5 MG
TABLET ORAL
COMMUNITY
Start: 2023-10-17

## 2024-02-09 RX ORDER — TRANEXAMIC ACID 650 MG/1
TABLET ORAL
COMMUNITY
Start: 2023-09-23

## 2024-02-09 RX ORDER — POLYETHYLENE GLYCOL-3350 AND ELECTROLYTES 236; 6.74; 5.86; 2.97; 22.74 G/274.31G; G/274.31G; G/274.31G; G/274.31G; G/274.31G
POWDER, FOR SOLUTION ORAL
COMMUNITY
Start: 2024-01-05

## 2024-02-09 RX ORDER — DIAZEPAM 5 MG/1
TABLET ORAL
COMMUNITY
Start: 2023-11-03

## 2024-02-09 ASSESSMENT — PATIENT HEALTH QUESTIONNAIRE - PHQ9
SUM OF ALL RESPONSES TO PHQ QUESTIONS 1-9: 1
SUM OF ALL RESPONSES TO PHQ9 QUESTIONS 1 & 2: 1
SUM OF ALL RESPONSES TO PHQ QUESTIONS 1-9: 1
SUM OF ALL RESPONSES TO PHQ QUESTIONS 1-9: 1
2. FEELING DOWN, DEPRESSED OR HOPELESS: 1
1. LITTLE INTEREST OR PLEASURE IN DOING THINGS: 0
SUM OF ALL RESPONSES TO PHQ QUESTIONS 1-9: 1

## 2024-02-09 NOTE — PROGRESS NOTES
Makenzie Cobb (:  1980) is a 43 y.o. female,here for evaluation of the following chief complaint(s):  Abdominal Pain (Weight loss)    /71   Pulse 86   Temp 97.8 °F (36.6 °C) (Temporal)   Resp 16   Ht 1.738 m (5' 8.43\")   Wt 64.5 kg (142 lb 3.2 oz)   LMP 2024 (Exact Date)   SpO2 100%   BMI 21.35 kg/m²       SUBJECTIVE/OBJECTIVE:    HPI:Patient is scheduled for colonoscopy next week with darya. She is getting vaginal lidocaine injections due to pelvic floor pain. She is seeing Dr. Coleman. She has lost 7 lbs in 2 months based on home scale without clothing, but according to recent office visits she has only lost 3-4 lbs since December. She notes loss of 1.5 in in waist. She feels full quickly. She has tried prilosec in December because she was previously taking a lot of NSAIDS, but stopped Prilosec in January. She just restarted it 3 days ago. She notes dull epigastric stomach ache. She has decreased appetite. She had constipation in January then became loose ribbon-like. Stools are back to normal. No blood or dark stools.    Review of Systems   Constitutional:  Positive for unexpected weight change.   Gastrointestinal:         Epigastric pain       Physical Exam  Constitutional:       Appearance: Normal appearance.   Cardiovascular:      Rate and Rhythm: Normal rate.   Pulmonary:      Effort: Pulmonary effort is normal.   Abdominal:      General: Abdomen is flat. Bowel sounds are normal.      Palpations: Abdomen is soft.      Tenderness: There is abdominal tenderness in the epigastric area.   Skin:     General: Skin is warm and dry.   Neurological:      General: No focal deficit present.      Mental Status: She is alert and oriented to person, place, and time.   Psychiatric:         Mood and Affect: Mood normal.         Behavior: Behavior normal.          ASSESSMENT/PLAN:  1. Weight loss  -     TSH; Future  -     CBC with Auto Differential; Future  -     Comprehensive Metabolic

## 2024-02-10 LAB
ALBUMIN SERPL-MCNC: 4.6 G/DL (ref 3.9–4.9)
ALBUMIN/GLOB SERPL: 2.2 {RATIO} (ref 1.2–2.2)
ALP SERPL-CCNC: 29 IU/L (ref 44–121)
ALT SERPL-CCNC: 10 IU/L (ref 0–32)
AST SERPL-CCNC: 13 IU/L (ref 0–40)
BASOPHILS # BLD AUTO: 0 X10E3/UL (ref 0–0.2)
BASOPHILS NFR BLD AUTO: 1 %
BILIRUB SERPL-MCNC: 0.4 MG/DL (ref 0–1.2)
BUN SERPL-MCNC: 15 MG/DL (ref 6–24)
BUN/CREAT SERPL: 19 (ref 9–23)
CALCIUM SERPL-MCNC: 8.8 MG/DL (ref 8.7–10.2)
CHLORIDE SERPL-SCNC: 102 MMOL/L (ref 96–106)
CO2 SERPL-SCNC: 22 MMOL/L (ref 20–29)
CREAT SERPL-MCNC: 0.79 MG/DL (ref 0.57–1)
EGFRCR SERPLBLD CKD-EPI 2021: 96 ML/MIN/1.73
EOSINOPHIL # BLD AUTO: 0.1 X10E3/UL (ref 0–0.4)
EOSINOPHIL NFR BLD AUTO: 2 %
ERYTHROCYTE [DISTWIDTH] IN BLOOD BY AUTOMATED COUNT: 12.9 % (ref 11.7–15.4)
GLOBULIN SER CALC-MCNC: 2.1 G/DL (ref 1.5–4.5)
GLUCOSE SERPL-MCNC: 90 MG/DL (ref 70–99)
HCT VFR BLD AUTO: 33.7 % (ref 34–46.6)
HGB BLD-MCNC: 11.6 G/DL (ref 11.1–15.9)
IMM GRANULOCYTES # BLD AUTO: 0 X10E3/UL (ref 0–0.1)
IMM GRANULOCYTES NFR BLD AUTO: 0 %
LYMPHOCYTES # BLD AUTO: 1.4 X10E3/UL (ref 0.7–3.1)
LYMPHOCYTES NFR BLD AUTO: 25 %
MCH RBC QN AUTO: 33.1 PG (ref 26.6–33)
MCHC RBC AUTO-ENTMCNC: 34.4 G/DL (ref 31.5–35.7)
MCV RBC AUTO: 96 FL (ref 79–97)
MONOCYTES # BLD AUTO: 0.4 X10E3/UL (ref 0.1–0.9)
MONOCYTES NFR BLD AUTO: 8 %
NEUTROPHILS # BLD AUTO: 3.6 X10E3/UL (ref 1.4–7)
NEUTROPHILS NFR BLD AUTO: 64 %
PLATELET # BLD AUTO: 195 X10E3/UL (ref 150–450)
POTASSIUM SERPL-SCNC: 4.2 MMOL/L (ref 3.5–5.2)
PROT SERPL-MCNC: 6.7 G/DL (ref 6–8.5)
RBC # BLD AUTO: 3.5 X10E6/UL (ref 3.77–5.28)
SODIUM SERPL-SCNC: 140 MMOL/L (ref 134–144)
TSH SERPL DL<=0.005 MIU/L-ACNC: 1.87 UIU/ML (ref 0.45–4.5)
WBC # BLD AUTO: 5.5 X10E3/UL (ref 3.4–10.8)

## 2024-02-15 RX ORDER — SUCRALFATE 1 G/1
1 TABLET ORAL
Qty: 21 TABLET | Refills: 0 | Status: SHIPPED | OUTPATIENT
Start: 2024-02-15 | End: 2024-02-22

## 2024-02-15 NOTE — RESULT ENCOUNTER NOTE
Results released to patient via [employer].  All labs are stable or at goal for her.  No changes from a few months ago.  Nothing to explain weight loss.  Agree w/ seeing GI.

## 2024-03-06 ENCOUNTER — TELEPHONE (OUTPATIENT)
Age: 44
End: 2024-03-06

## 2024-03-06 NOTE — TELEPHONE ENCOUNTER
Today our office received a fax from [facility]  that patient has an appointment with Dr. Tony Nam on 3/13/24 and they are requesting office note, lab results etc    Per Dr. Izquierdo- comply    3/6/24 3:38pm  2/9/24 office note & 12/5/23 office note, & 2/9/24 lab results have been faxed to [facility] at fax # [phone] approved fax confirmation received.    Request and records will be sent to scanning to be uploaded in pt's chart.    Fax request from [facility] also note if pt's insurance company require referral that our office take care of it. I have given request to Lesley referral coordinator in the office to   review if referral is needed.     Jordyn Jolly CMA

## 2024-03-26 ENCOUNTER — OFFICE VISIT (OUTPATIENT)
Age: 44
End: 2024-03-26
Payer: COMMERCIAL

## 2024-03-26 VITALS
BODY MASS INDEX: 21.57 KG/M2 | HEART RATE: 87 BPM | RESPIRATION RATE: 16 BRPM | WEIGHT: 142.3 LBS | HEIGHT: 68 IN | TEMPERATURE: 97.3 F | SYSTOLIC BLOOD PRESSURE: 133 MMHG | OXYGEN SATURATION: 100 % | DIASTOLIC BLOOD PRESSURE: 86 MMHG

## 2024-03-26 DIAGNOSIS — M79.18 CHRONIC BUTTOCK PAIN: Primary | ICD-10-CM

## 2024-03-26 DIAGNOSIS — G89.29 CHRONIC BUTTOCK PAIN: Primary | ICD-10-CM

## 2024-03-26 PROCEDURE — 99214 OFFICE O/P EST MOD 30 MIN: CPT | Performed by: INTERNAL MEDICINE

## 2024-03-26 RX ORDER — GABAPENTIN 300 MG/1
300 CAPSULE ORAL 2 TIMES DAILY
Qty: 30 CAPSULE | Refills: 0 | Status: SHIPPED | OUTPATIENT
Start: 2024-03-26 | End: 2024-04-10

## 2024-03-26 RX ORDER — OMEPRAZOLE 40 MG/1
40 CAPSULE, DELAYED RELEASE ORAL DAILY
COMMUNITY
Start: 2024-03-25

## 2024-03-26 ASSESSMENT — ENCOUNTER SYMPTOMS
CONSTIPATION: 0
DIARRHEA: 0
SHORTNESS OF BREATH: 0
COUGH: 0
VOMITING: 0
ABDOMINAL PAIN: 0
BACK PAIN: 0
NAUSEA: 0

## 2024-03-26 NOTE — ASSESSMENT & PLAN NOTE
Chronic issue, pain is stable, she is still w/o a definitive diagnosis.  We reviewed [facility] specialist records and we discussed trying Gabapentin to see if there is a nerve component.  Reviewed my expectations and I think it is worth trying but not optimistic.  She will start w/ 1 tab daily x 3 days and if doing well will increase to 1 tab BID.  We reviewed 100mg vs 300mg and how this is hopefully therapeutic but also somewhat diagnostic.  We also discussed how I think there is a very big anxiety component.  I strongly recommended talking to her psychiatrist about increasing dose of Lexapro vs transitioning to Cymbalta.  Reviewed the rational for making this change.  I think she can safely start to exercise again.  Reviewed there is nothing to suggest any structural damage to the joints or tendons/ligaments.

## 2024-03-26 NOTE — PROGRESS NOTES
Makenzie Cobb is a 43 y.o. female who was seen in clinic today (3/26/2024).    Assessment & Plan:   Below is the assessment and plan developed based on review of pertinent history, physical exam, labs, studies, and medications.    1. Chronic buttock pain  Assessment & Plan:  Chronic issue, pain is stable, she is still w/o a definitive diagnosis.  We reviewed [facility] specialist records and we discussed trying Gabapentin to see if there is a nerve component.  Reviewed my expectations and I think it is worth trying but not optimistic.  She will start w/ 1 tab daily x 3 days and if doing well will increase to 1 tab BID.  We reviewed 100mg vs 300mg and how this is hopefully therapeutic but also somewhat diagnostic.  We also discussed how I think there is a very big anxiety component.  I strongly recommended talking to her psychiatrist about increasing dose of Lexapro vs transitioning to Cymbalta.  Reviewed the rational for making this change.  I think she can safely start to exercise again.  Reviewed there is nothing to suggest any structural damage to the joints or tendons/ligaments.      Orders:  -     gabapentin (NEURONTIN) 300 MG capsule; Take 1 capsule by mouth 2 times daily for 15 days. Intended supply: 30 days Max Daily Amount: 600 mg, Disp-30 capsule, R-0Normal       On this date 3/26/2024 I have spent 33 minutes reviewing previous notes, test results and face to face with the patient discussing the diagnosis and importance of compliance with the treatment plan as well as documenting on the day of the visit.       Return in about 1 month (around 4/26/2024), or if symptoms worsen or fail to improve.   Subjective/Objective:   Makenzie was seen today for Pain       Pain Review:  She presents do to pain of her left buttock that is secondary to doing a glute work out in February 2023.  There was also a fall around this time.  Since it started her pain has  fluctuated but never resolved.   She describes the pain as itching

## 2024-04-29 ENCOUNTER — PATIENT MESSAGE (OUTPATIENT)
Age: 44
End: 2024-04-29

## 2024-04-29 NOTE — TELEPHONE ENCOUNTER
From: Makenzie Cobb  To: Dr. Noe Izquierdo  Sent: 4/29/2024 8:36 AM EDT  Subject: [facility] Luis Eduardo Izquierdo,    Would your office be able to please fill out this form for me based on my last bloodwork done in 2024? It is for our insurance benefit discount. My waist circumference is 28\".    Thank you!

## 2024-06-13 NOTE — PATIENT INSTRUCTIONS
06/13/24 0712   Discharge Planning   Living Arrangements Children   Support Systems Children   Type of Residence Private residence   Do you have animals or pets at home? No   Who is requesting discharge planning? Patient   Home or Post Acute Services None   Patient expects to be discharged to: home with daughter   Does the patient need discharge transport arranged? Yes   RoundTrip coordination needed? Yes   Has discharge transport been arranged? No   Financial Resource Strain   How hard is it for you to pay for the very basics like food, housing, medical care, and heating? Not very   Housing Stability   In the last 12 months, was there a time when you were not able to pay the mortgage or rent on time? N   In the last 12 months, was there a time when you did not have a steady place to sleep or slept in a shelter (including now)? N   Transportation Needs   In the past 12 months, has lack of transportation kept you from medical appointments or from getting medications? no   In the past 12 months, has lack of transportation kept you from meetings, work, or from getting things needed for daily living? No   Patient Choice   Provider Choice list and CMS website (https://medicare.gov/care-compare#search) for post-acute Quality and Resource Measure Data were provided and reviewed with: Patient     I met with this patient at her bedside she is alertx3, at her baseline she is independent with her ADL's lives at home with her daughter, she stated she does use a walker at home, she did  not have any problems getting to her follow up appointments or getting her medications, she will not need transportation home, she does not have any financial or social concerns, she is hoping to discharge later today, I will continue to monitor for discharge planning.   Allergy Treatments:  1. Nasal rinses:  Saline rinses or salt water rinses or Neti pot  2. Anti-histamines: allegra (fenofexadine) or claritn (loratidine) or zyrtec (certizine)  3.  Nasal steroids: Nasacort and Flonase (are over the counter & prescription)

## 2024-11-12 ENCOUNTER — TELEPHONE (OUTPATIENT)
Age: 44
End: 2024-11-12

## 2024-11-12 NOTE — TELEPHONE ENCOUNTER
Reason for call:  TC from Jaimee campbell/[facility]. Pt id verified by two identifiers. Jaimee states a fax request was sent to office on 10/22/24 requesting pt's last 6 month labs to be faxed to [facility]. Jaimee stated as of today they have not received anything. Jaimee stated last 6 month labs can be faxed to [facility] at fax number: [phone]. Copy of pt's last 6 month labs faxed to Jaimee's attn at [facility] at fax number: [phone]. Confirmation received.    Best call back number[facility]/Phone Number: [phone]/Fax Number: [phone]

## 2024-12-10 ENCOUNTER — OFFICE VISIT (OUTPATIENT)
Age: 44
End: 2024-12-10
Payer: COMMERCIAL

## 2024-12-10 VITALS
BODY MASS INDEX: 20.79 KG/M2 | SYSTOLIC BLOOD PRESSURE: 126 MMHG | RESPIRATION RATE: 16 BRPM | OXYGEN SATURATION: 98 % | HEIGHT: 70 IN | DIASTOLIC BLOOD PRESSURE: 84 MMHG | TEMPERATURE: 97.3 F | HEART RATE: 78 BPM | WEIGHT: 145.2 LBS

## 2024-12-10 DIAGNOSIS — G58.8 PUDENDAL NEURALGIA: ICD-10-CM

## 2024-12-10 DIAGNOSIS — F41.9 ANXIETY: ICD-10-CM

## 2024-12-10 DIAGNOSIS — Z00.00 ROUTINE PHYSICAL EXAMINATION: Primary | ICD-10-CM

## 2024-12-10 PROCEDURE — 99396 PREV VISIT EST AGE 40-64: CPT | Performed by: INTERNAL MEDICINE

## 2024-12-10 RX ORDER — ELECTROLYTES/DEXTROSE
1 SOLUTION, ORAL ORAL DAILY
COMMUNITY

## 2024-12-10 SDOH — ECONOMIC STABILITY: FOOD INSECURITY: WITHIN THE PAST 12 MONTHS, THE FOOD YOU BOUGHT JUST DIDN'T LAST AND YOU DIDN'T HAVE MONEY TO GET MORE.: NEVER TRUE

## 2024-12-10 SDOH — ECONOMIC STABILITY: INCOME INSECURITY: HOW HARD IS IT FOR YOU TO PAY FOR THE VERY BASICS LIKE FOOD, HOUSING, MEDICAL CARE, AND HEATING?: NOT HARD AT ALL

## 2024-12-10 SDOH — ECONOMIC STABILITY: FOOD INSECURITY: WITHIN THE PAST 12 MONTHS, YOU WORRIED THAT YOUR FOOD WOULD RUN OUT BEFORE YOU GOT MONEY TO BUY MORE.: NEVER TRUE

## 2024-12-10 ASSESSMENT — PATIENT HEALTH QUESTIONNAIRE - PHQ9
1. LITTLE INTEREST OR PLEASURE IN DOING THINGS: NOT AT ALL
SUM OF ALL RESPONSES TO PHQ QUESTIONS 1-9: 0
SUM OF ALL RESPONSES TO PHQ9 QUESTIONS 1 & 2: 0
SUM OF ALL RESPONSES TO PHQ QUESTIONS 1-9: 0
2. FEELING DOWN, DEPRESSED OR HOPELESS: NOT AT ALL

## 2024-12-10 ASSESSMENT — ENCOUNTER SYMPTOMS
COUGH: 0
SHORTNESS OF BREATH: 0
ABDOMINAL PAIN: 0
VOMITING: 0
NAUSEA: 0
BLOOD IN STOOL: 0
CONSTIPATION: 0
DIARRHEA: 0

## 2024-12-10 ASSESSMENT — LIFESTYLE VARIABLES
HOW OFTEN DO YOU HAVE A DRINK CONTAINING ALCOHOL: 2-3 TIMES A WEEK
HOW MANY STANDARD DRINKS CONTAINING ALCOHOL DO YOU HAVE ON A TYPICAL DAY: 1 OR 2

## 2024-12-10 NOTE — ASSESSMENT & PLAN NOTE
Overall stable.  Changed from Buspar to Lexapro and she reports it is helping.  Monitored by specialist- no acute findings meriting change in the plan

## 2024-12-10 NOTE — ASSESSMENT & PLAN NOTE
New dx, chronic issue, seeing neurology and orthopedics.  Monitored by specialist- no acute findings meriting change in the plan

## 2024-12-10 NOTE — PROGRESS NOTES
Makenzie Cobb is a 44 y.o. female who was seen in clinic today (12/10/2024) for a full physical.        Assessment & Plan:   Below is the assessment and plan developed based on review of pertinent history, physical exam, labs, studies, and medications.    1. Routine physical examination  Comments:  Previous labs reviewed and will defer to next year  2. Anxiety  Assessment & Plan:  Overall stable.  Changed from Buspar to Lexapro and she reports it is helping.  Monitored by specialist- no acute findings meriting change in the plan  3. Pudendal neuralgia  Assessment & Plan:  New dx, chronic issue, seeing neurology and orthopedics.  Monitored by specialist- no acute findings meriting change in the plan     Return in 1 year (on 12/10/2025) for CPE (Physical Exam).   Subjective:   Makenzie is here today for a full physical.    Since last visit: weight is stable.  She is seeing neurology and orthopedics for buttock pain - pudendal pain vs ischial bursitis.    Health Maintenance  Immunizations:   Covid: up to date  Influenza: up to date   Tetanus: up to date    Cancer screening:   Cervical: reviewed guidelines, she has f/u with OBGYN today.  Breast: reviewed guidelines, UTD, done by OBGYN, records requested.       The following sections were reviewed & updated as appropriate: Problem List, Allergies, PMH, PSH, FH, and SH.    Prior to Admission medications    Medication Sig Start Date End Date Taking? Authorizing Provider   Multiple Vitamin (MULTIVITAMIN ADULT) TABS Take 1 tablet by mouth daily   Yes ProviderJuan MD   LEXAPRO 5 MG tablet Take 2 tablets by mouth daily 10/3/23  Yes Provider, MD Juan        Review of Systems   Constitutional:  Negative for chills, fatigue and fever.   Respiratory:  Negative for cough and shortness of breath.    Cardiovascular:  Negative for chest pain and palpitations.   Gastrointestinal:  Negative for abdominal pain, blood in stool, constipation, diarrhea, nausea and vomiting.